# Patient Record
Sex: MALE | Race: WHITE | NOT HISPANIC OR LATINO | Employment: OTHER | URBAN - NONMETROPOLITAN AREA
[De-identification: names, ages, dates, MRNs, and addresses within clinical notes are randomized per-mention and may not be internally consistent; named-entity substitution may affect disease eponyms.]

---

## 2017-05-31 ENCOUNTER — TRANSCRIBE ORDERS (OUTPATIENT)
Dept: ADMINISTRATIVE | Facility: HOSPITAL | Age: 67
End: 2017-05-31

## 2017-05-31 ENCOUNTER — HOSPITAL ENCOUNTER (OUTPATIENT)
Dept: RADIOLOGY | Facility: HOSPITAL | Age: 67
Discharge: HOME | End: 2017-05-31
Attending: UROLOGY
Payer: COMMERCIAL

## 2017-05-31 DIAGNOSIS — N20.0 CALCULUS OF KIDNEY: ICD-10-CM

## 2017-05-31 PROCEDURE — 74000 HB X-RAY EXAM OF ABDOMEN (SINGLE ANTEROPOSTERIOR VIEW): CPT

## 2017-06-05 ENCOUNTER — ALLSCRIPTS OFFICE VISIT (OUTPATIENT)
Dept: OTHER | Facility: OTHER | Age: 67
End: 2017-06-05

## 2017-06-05 DIAGNOSIS — Z12.5 ENCOUNTER FOR SCREENING FOR MALIGNANT NEOPLASM OF PROSTATE: ICD-10-CM

## 2017-06-05 LAB
CLARITY UR: NORMAL
COLOR UR: YELLOW
GLUCOSE (HISTORICAL): NORMAL
HGB UR QL STRIP.AUTO: NORMAL
KETONES UR STRIP-MCNC: NORMAL MG/DL
LEUKOCYTE ESTERASE UR QL STRIP: NORMAL
NITRITE UR QL STRIP: NORMAL
PH UR STRIP.AUTO: 5 [PH]
PROT UR STRIP-MCNC: NORMAL MG/DL
SP GR UR STRIP.AUTO: 1.01

## 2017-06-06 ENCOUNTER — ALLSCRIPTS OFFICE VISIT (OUTPATIENT)
Dept: FAMILY MEDICINE CLINIC | Facility: CLINIC | Age: 67
End: 2017-06-06
Payer: COMMERCIAL

## 2017-06-06 LAB — HBA1C MFR BLD HPLC: 6.4 %

## 2017-06-06 PROCEDURE — 83036 HEMOGLOBIN GLYCOSYLATED A1C: CPT | Performed by: FAMILY MEDICINE

## 2017-06-06 PROCEDURE — 99213 OFFICE O/P EST LOW 20 MIN: CPT | Performed by: FAMILY MEDICINE

## 2017-06-07 ENCOUNTER — TRANSCRIBE ORDERS (OUTPATIENT)
Dept: ADMINISTRATIVE | Facility: HOSPITAL | Age: 67
End: 2017-06-07

## 2017-06-07 ENCOUNTER — APPOINTMENT (OUTPATIENT)
Dept: LAB | Facility: HOSPITAL | Age: 67
End: 2017-06-07
Attending: UROLOGY
Payer: COMMERCIAL

## 2017-06-07 DIAGNOSIS — Z12.5 ENCOUNTER FOR SCREENING FOR MALIGNANT NEOPLASM OF PROSTATE: ICD-10-CM

## 2017-06-07 LAB — PSA SERPL-MCNC: 3.1 NG/ML (ref 0–4)

## 2017-06-07 PROCEDURE — 36415 COLL VENOUS BLD VENIPUNCTURE: CPT

## 2017-06-07 PROCEDURE — G0103 PSA SCREENING: HCPCS

## 2017-12-06 ENCOUNTER — ALLSCRIPTS OFFICE VISIT (OUTPATIENT)
Dept: FAMILY MEDICINE CLINIC | Facility: CLINIC | Age: 67
End: 2017-12-06
Payer: COMMERCIAL

## 2017-12-06 LAB — HBA1C MFR BLD HPLC: 6.3 %

## 2017-12-06 PROCEDURE — 99213 OFFICE O/P EST LOW 20 MIN: CPT | Performed by: FAMILY MEDICINE

## 2017-12-23 ENCOUNTER — HOSPITAL ENCOUNTER (EMERGENCY)
Facility: HOSPITAL | Age: 67
Discharge: HOME | End: 2017-12-23
Payer: COMMERCIAL

## 2017-12-23 ENCOUNTER — APPOINTMENT (EMERGENCY)
Dept: RADIOLOGY | Facility: HOSPITAL | Age: 67
End: 2017-12-23
Payer: COMMERCIAL

## 2017-12-23 VITALS
OXYGEN SATURATION: 96 % | HEART RATE: 71 BPM | HEIGHT: 68 IN | SYSTOLIC BLOOD PRESSURE: 132 MMHG | TEMPERATURE: 98.2 F | WEIGHT: 215 LBS | DIASTOLIC BLOOD PRESSURE: 70 MMHG | BODY MASS INDEX: 32.58 KG/M2 | RESPIRATION RATE: 12 BRPM

## 2017-12-23 DIAGNOSIS — K21.9 GERD (GASTROESOPHAGEAL REFLUX DISEASE): ICD-10-CM

## 2017-12-23 DIAGNOSIS — R07.9 CHEST PAIN: Primary | ICD-10-CM

## 2017-12-23 LAB
ALBUMIN SERPL BCP-MCNC: 3.9 G/DL (ref 3.5–5)
ALP SERPL-CCNC: 66 U/L (ref 46–116)
ALT SERPL W P-5'-P-CCNC: 45 U/L (ref 12–78)
ANION GAP SERPL CALCULATED.3IONS-SCNC: 7 MMOL/L (ref 4–13)
AST SERPL W P-5'-P-CCNC: 18 U/L (ref 5–45)
BASOPHILS # BLD AUTO: 0.03 THOUSANDS/ΜL (ref 0–0.1)
BASOPHILS NFR BLD AUTO: 0 % (ref 0–1)
BILIRUB SERPL-MCNC: 0.3 MG/DL (ref 0.2–1)
BUN SERPL-MCNC: 30 MG/DL (ref 5–25)
CALCIUM SERPL-MCNC: 8.7 MG/DL (ref 8.3–10.1)
CHLORIDE SERPL-SCNC: 103 MMOL/L (ref 100–108)
CO2 SERPL-SCNC: 28 MMOL/L (ref 21–32)
CREAT SERPL-MCNC: 1.55 MG/DL (ref 0.6–1.3)
DEPRECATED D DIMER PPP: 345 NG/ML (FEU) (ref 0–424)
EOSINOPHIL # BLD AUTO: 0.31 THOUSAND/ΜL (ref 0–0.61)
EOSINOPHIL NFR BLD AUTO: 3 % (ref 0–6)
ERYTHROCYTE [DISTWIDTH] IN BLOOD BY AUTOMATED COUNT: 12.6 % (ref 11.6–15.1)
GFR SERPL CREATININE-BSD FRML MDRD: 45 ML/MIN/1.73SQ M
GLUCOSE SERPL-MCNC: 188 MG/DL (ref 65–140)
HCT VFR BLD AUTO: 35.5 % (ref 36.5–49.3)
HGB BLD-MCNC: 11.7 G/DL (ref 12–17)
LIPASE SERPL-CCNC: 175 U/L (ref 73–393)
LYMPHOCYTES # BLD AUTO: 1.36 THOUSANDS/ΜL (ref 0.6–4.47)
LYMPHOCYTES NFR BLD AUTO: 13 % (ref 14–44)
MCH RBC QN AUTO: 30.6 PG (ref 26.8–34.3)
MCHC RBC AUTO-ENTMCNC: 33 G/DL (ref 31.4–37.4)
MCV RBC AUTO: 93 FL (ref 82–98)
MONOCYTES # BLD AUTO: 1.18 THOUSAND/ΜL (ref 0.17–1.22)
MONOCYTES NFR BLD AUTO: 12 % (ref 4–12)
NEUTROPHILS # BLD AUTO: 7.39 THOUSANDS/ΜL (ref 1.85–7.62)
NEUTS SEG NFR BLD AUTO: 72 % (ref 43–75)
PLATELET # BLD AUTO: 263 THOUSANDS/UL (ref 149–390)
PMV BLD AUTO: 9 FL (ref 8.9–12.7)
POTASSIUM SERPL-SCNC: 4.2 MMOL/L (ref 3.5–5.3)
PROT SERPL-MCNC: 7.5 G/DL (ref 6.4–8.2)
RBC # BLD AUTO: 3.82 MILLION/UL (ref 3.88–5.62)
SODIUM SERPL-SCNC: 138 MMOL/L (ref 136–145)
TROPONIN I SERPL-MCNC: <0.02 NG/ML
WBC # BLD AUTO: 10.27 THOUSAND/UL (ref 4.31–10.16)

## 2017-12-23 PROCEDURE — 71020 HB CHEST X-RAY 2VW FRONTAL&LATL: CPT

## 2017-12-23 PROCEDURE — 83690 ASSAY OF LIPASE: CPT | Performed by: PHYSICIAN ASSISTANT

## 2017-12-23 PROCEDURE — 85379 FIBRIN DEGRADATION QUANT: CPT | Performed by: PHYSICIAN ASSISTANT

## 2017-12-23 PROCEDURE — 93005 ELECTROCARDIOGRAM TRACING: CPT

## 2017-12-23 PROCEDURE — 36415 COLL VENOUS BLD VENIPUNCTURE: CPT

## 2017-12-23 PROCEDURE — 99285 EMERGENCY DEPT VISIT HI MDM: CPT

## 2017-12-23 PROCEDURE — 84484 ASSAY OF TROPONIN QUANT: CPT

## 2017-12-23 PROCEDURE — 80053 COMPREHEN METABOLIC PANEL: CPT

## 2017-12-23 PROCEDURE — 85025 COMPLETE CBC W/AUTO DIFF WBC: CPT

## 2017-12-23 RX ORDER — SUCRALFATE ORAL 1 G/10ML
1000 SUSPENSION ORAL ONCE
Status: COMPLETED | OUTPATIENT
Start: 2017-12-23 | End: 2017-12-23

## 2017-12-23 RX ORDER — MAGNESIUM HYDROXIDE/ALUMINUM HYDROXICE/SIMETHICONE 120; 1200; 1200 MG/30ML; MG/30ML; MG/30ML
30 SUSPENSION ORAL ONCE
Status: COMPLETED | OUTPATIENT
Start: 2017-12-23 | End: 2017-12-23

## 2017-12-23 RX ADMIN — SUCRALFATE 1000 MG: 1 SUSPENSION ORAL at 16:35

## 2017-12-23 RX ADMIN — ALUMINUM HYDROXIDE, MAGNESIUM HYDROXIDE, AND SIMETHICONE 30 ML: 200; 200; 20 SUSPENSION ORAL at 16:36

## 2017-12-23 RX ADMIN — LIDOCAINE HYDROCHLORIDE 15 ML: 20 SOLUTION ORAL; TOPICAL at 16:36

## 2017-12-23 NOTE — ED PROCEDURE NOTE
Procedure  ECG 12 Lead Documentation  Date/Time: 12/23/2017 3:47 PM  Performed by: Brie Grover  Authorized by: Derik HILLS     Indications / Diagnosis:  Chest pain  ECG reviewed by me, the ED Provider: yes    Patient location:  Bedside  Interpretation:     Interpretation: normal    Rate:     ECG rate:  90    ECG rate assessment: normal    Rhythm:     Rhythm: sinus rhythm    Ectopy:     Ectopy: none    QRS:     QRS axis:  Normal    QRS intervals:  Normal  Conduction:     Conduction: normal    ST segments:     ST segments:  Normal  T waves:     T waves: normal

## 2017-12-23 NOTE — DISCHARGE INSTRUCTIONS
Chest Pain, Ambulatory Care   GENERAL INFORMATION:   Chest pain  can be caused by a range of conditions, from not serious to life-threatening  It may be caused by a heart attack or a blood clot in your lungs  Sometimes chest pain or pressure is caused by poor blood flow to your heart (angina)  Infection, inflammation, or a fracture in the bones or cartilage in your chest can cause pain or discomfort  Chest pain can also be a symptom of a digestive problem, such as acid reflux or a stomach ulcer  Common symptoms include the following:   · Fever or sweating     · Nausea or vomiting     · Shortness of breath     · Discomfort or pressure that spreads from your chest to your back, jaw, or arm     · A racing or slow heartbeat     · Feeling weak, tired, or faint  Seek immediate care for the following symptoms:   · Any of the following signs of a heart attack:      ¨ Squeezing, pressure, or pain in your chest that lasts longer than 5 minutes or returns    ¨ Discomfort or pain in your back, neck, jaw, stomach, or arm     ¨ Trouble breathing     ¨ Nausea or vomiting    ¨ Lightheadedness or a sudden cold sweat, especially with trouble breathing         · Chest discomfort that gets worse, even with medicine    · Coughing or vomiting blood    · Black or bloody bowel movements     · Vomiting that does not stop, or pain when you swallow  Treatment for chest pain  may include medicine to treat your symptoms while he determines the cause of your chest pain  You may also need any of the following:  · Antiplatelets , such as aspirin, help prevent blood clots  Take your antiplatelet medicine exactly as directed  These medicines make it more likely for you to bleed or bruise  If you are told to take aspirin, do not take acetaminophen or ibuprofen instead  · Prescription pain medicine  may be given  Ask how to take this medicine safely  Do not smoke: If you smoke, it is never too late to quit   Smoking increases your risk for a heart attack and other heart and lung conditions  Ask your healthcare provider for information about how to stop smoking if you need help  Follow up with your healthcare provider as directed: You may need more tests  You may be referred to a specialist, such as a cardiologist or gastroenterologist  Write down your questions so you remember to ask them during your visits  CARE AGREEMENT:   You have the right to help plan your care  Learn about your health condition and how it may be treated  Discuss treatment options with your caregivers to decide what care you want to receive  You always have the right to refuse treatment  The above information is an  only  It is not intended as medical advice for individual conditions or treatments  Talk to your doctor, nurse or pharmacist before following any medical regimen to see if it is safe and effective for you  © 2014 [address] is for End User's use only and may not be sold, redistributed or otherwise used for commercial purposes  All illustrations and images included in CareNotes® are the copyrighted property of [employer]  or [employer]  Gastroesophageal Reflux Disease   WHAT YOU NEED TO KNOW:   Gastroesophageal reflux occurs when acid and food in the stomach back up into the esophagus  Gastroesophageal reflux disease (GERD) is reflux that occurs more than twice a week for a few weeks  It usually causes heartburn and other symptoms  GERD can cause other health problems over time if it is not treated  DISCHARGE INSTRUCTIONS:   Seek care immediately if:   · You feel full and cannot burp or vomit  · You have severe chest pain and sudden trouble breathing  · Your bowel movements are black, bloody, or tarry-looking  · Your vomit looks like coffee grounds or has blood in it  Contact your healthcare provider if:   · You vomit large amounts, or you vomit often      · You have trouble breathing after you vomit  · You have trouble swallowing, or pain with swallowing  · You are losing weight without trying  · Your symptoms get worse or do not improve with treatment  · You have questions or concerns about your condition or care  Medicines:   · Medicines  are used to decrease stomach acid  Medicine may also be used to help your lower esophageal sphincter and stomach contract (tighten) more  · Take your medicine as directed  Contact your healthcare provider if you think your medicine is not helping or if you have side effects  Tell him or her if you are allergic to any medicine  Keep a list of the medicines, vitamins, and herbs you take  Include the amounts, and when and why you take them  Bring the list or the pill bottles to follow-up visits  Carry your medicine list with you in case of an emergency  Manage GERD:   · Do not have foods or drinks that may increase heartburn  These include chocolate, peppermint, fried or fatty foods, drinks that contain caffeine, or carbonated drinks (soda)  Other foods include spicy foods, onions, tomatoes, and tomato-based foods  Do not have foods or drinks that can irritate your esophagus, such as citrus fruits, juices, and alcohol  · Do not eat large meals  When you eat a lot of food at one time, your stomach needs more acid to digest it  Eat 6 small meals each day instead of 3 large ones, and eat slowly  Do not eat meals 2 to 3 hours before bedtime  · Elevate the head of your bed  Place 6-inch blocks under the head of your bed frame  You may also use more than one pillow under your head and shoulders while you sleep  · Maintain a healthy weight  If you are overweight, weight loss may help relieve symptoms of GERD  · Do not smoke  Smoking weakens the lower esophageal sphincter and increases the risk of GERD  Ask your healthcare provider for information if you currently smoke and need help to quit   E-cigarettes or smokeless tobacco still contain nicotine  Talk to your healthcare provider before you use these products  · Do not wear clothing that is tight around your waist   Tight clothing can put pressure on your stomach and cause or worsen GERD symptoms  Follow up with your healthcare provider as directed:  Write down your questions so you remember to ask them during your visits  © 2017 [address] Information is for End User's use only and may not be sold, redistributed or otherwise used for commercial purposes  All illustrations and images included in CareNotes® are the copyrighted property of [employer]  or [employer] Shepherd  The above information is an  only  It is not intended as medical advice for individual conditions or treatments  Talk to your doctor, nurse or pharmacist before following any medical regimen to see if it is safe and effective for you

## 2017-12-23 NOTE — ED PROVIDER NOTES
History  Chief Complaint   Patient presents with    Chest Pain     Intermittent sharp chest pain since last night, on left side radiating into back     Patient presents to the emergency department today offering a chief complaining of left-sided chest pain which commenced at around 2300 hours last evening  States the pain is sharp in the left upper chest and then dull in the left side of the back  Patient states the pain has been intermittent in nature  Patient denies any novel shortness of breath or dyspnea on exertion  He denies leg pain or swelling  Denies fevers  Patient states he has been worked up for cardiac related events in the past however this does not feel likely had before  Has been diagnosed with anxiety  Patient states he has had multiple echocardiograms 3 different at work and is very sure that he also had a clean cardiac catheterization  Prior to Admission Medications   Prescriptions Last Dose Informant Patient Reported? Taking? ALPRAZolam (XANAX) 0 5 mg tablet   Yes No   Sig: Take 0 5 mg by mouth 2 (two) times a day  ALPRAZolam (XANAX) 0 5 mg tablet   Yes Yes   Sig: Take 0 5 mg by mouth daily as needed for anxiety  Multiple Vitamin (MULTIVITAMIN) tablet   Yes Yes   Sig: Take 1 tablet by mouth daily  aspirin 81 MG tablet   Yes Yes   Sig: Take 81 mg by mouth daily  atorvastatin (LIPITOR) 80 mg tablet   Yes Yes   Sig: Take 80 mg by mouth daily  enalapril (VASOTEC) 20 mg tablet   Yes Yes   Sig: Take 20 mg by mouth daily  famotidine (PEPCID) 40 MG tablet   Yes Yes   Sig: Take 40 mg by mouth 2 (two) times a day  glimepiride (AMARYL) 2 mg tablet   Yes Yes   Sig: Take 4 mg by mouth every evening  metFORMIN (GLUCOPHAGE) 500 mg tablet   Yes Yes   Sig: Take 500 mg by mouth Indications: 1 tab in am and 2 tabs in evening  sitaGLIPtin (JANUVIA) 100 mg tablet   Yes Yes   Sig: Take 100 mg by mouth daily        Facility-Administered Medications: None       Past Medical History:   Diagnosis Date    Anxiety     Chronic kidney disease     kidney stones    Diabetes mellitus ([address] )     High cholesterol     Hypertension        Past Surgical History:   Procedure Laterality Date    APPENDECTOMY      CHOLECYSTECTOMY      UT CYSTO/URETERO W/LITHOTRIPSY &INDWELL STENT INSRT Right 2/3/2016    Procedure: CYSTOSCOPY; URETEROSCOPY; HOLMIUM LASER LITHOTRIPSY; BASKET STONE EXTRACTION; RETROGRADE PYELOGRAM; STENT PLACEMENT ;  Surgeon: Bre Kaye MD;  Location: [facility];  Service: Urology       History reviewed  No pertinent family history  I have reviewed and agree with the history as documented  Social History   Substance Use Topics    Smoking status: Never Smoker    Smokeless tobacco: Never Used    Alcohol use No        Review of Systems   Constitutional: Negative  HENT: Negative  Eyes: Negative  Respiratory: Negative  Cardiovascular: Positive for chest pain  Gastrointestinal: Negative  Endocrine: Negative  Genitourinary: Negative  Musculoskeletal: Negative  Skin: Negative  Allergic/Immunologic: Negative  Neurological: Negative  Hematological: Negative  Psychiatric/Behavioral: Negative  All other systems reviewed and are negative  Physical Exam  ED Triage Vitals [12/23/17 1429]   Temperature Pulse Respirations Blood Pressure SpO2   98 2 °F (36 8 °C) 94 18 145/69 97 %      Temp Source Heart Rate Source Patient Position - Orthostatic VS BP Location FiO2 (%)   Temporal Monitor Lying Right arm --      Pain Score       No Pain           Orthostatic Vital Signs  Vitals:    12/23/17 1500 12/23/17 1530 12/23/17 1600 12/23/17 1630   BP: 142/70 134/69 136/70 118/54   Pulse: 86 79 74 75   Patient Position - Orthostatic VS: Lying Lying  Lying       Physical Exam   Constitutional: He is oriented to person, place, and time  He appears well-developed and well-nourished  No distress  HENT:   Head: Normocephalic     Eyes: Pupils are equal, round, and reactive to light  Neck: Normal range of motion  Cardiovascular: Normal rate, regular rhythm, normal heart sounds and intact distal pulses  Exam reveals no gallop and no friction rub  No murmur heard  Pulmonary/Chest: Effort normal and breath sounds normal  No respiratory distress  He has no wheezes  He has no rales  He exhibits no tenderness  Abdominal: Soft  Musculoskeletal: Normal range of motion  Neurological: He is alert and oriented to person, place, and time  Skin: Skin is warm  Capillary refill takes less than 2 seconds  He is not diaphoretic  Psychiatric: He has a normal mood and affect  Vitals reviewed  ED Medications  Medications   aluminum-magnesium hydroxide-simethicone (MYLANTA) 200-200-20 mg/5 mL oral suspension 30 mL (30 mL Oral Given 12/23/17 1636)   lidocaine viscous (XYLOCAINE) 2 % mucosal solution 15 mL (15 mL Swish & Spit Given 12/23/17 1636)   sucralfate (CARAFATE) oral suspension 1,000 mg (1,000 mg Oral Given 12/23/17 1635)       Diagnostic Studies  Results Reviewed     Procedure Component Value Units Date/Time    Lipase [[id number]]  (Normal) Collected:  12/23/17 1433    Lab Status:  Final result Specimen:  Blood from Arm, Left Updated:  12/23/17 1540     Lipase 175 u/L     D-Dimer [[id number]]  (Normal) Collected:  12/23/17 1433    Lab Status:  Final result Specimen:  Blood from Arm, Left Updated:  12/23/17 1518     D-Dimer, Quant 345 ng/ml (FEU)     Troponin I [[id number]]  (Normal) Collected:  12/23/17 1433    Lab Status:  Final result Specimen:  Blood from Arm, Left Updated:  12/23/17 1457     Troponin I <0 02 ng/mL     Narrative:         [employer] Chemistry analyzer 99% cutoff is > 0 04 ng/mL in network labs    o cTnI 99% cutoff is useful only when applied to patients in the clinical setting of myocardial ischemia  o cTnI 99% cutoff should be interpreted in the context of clinical history, ECG findings and possibly cardiac imaging to establish correct diagnosis    o cTnI 99% cutoff may be suggestive but clearly not indicative of a coronary event without the clinical setting of myocardial ischemia  Comprehensive metabolic panel [[id number]]  (Abnormal) Collected:  12/23/17 1433    Lab Status:  Final result Specimen:  Blood from Arm, Left Updated:  12/23/17 1453     Sodium 138 mmol/L      Potassium 4 2 mmol/L      Chloride 103 mmol/L      CO2 28 mmol/L      Anion Gap 7 mmol/L      BUN 30 (H) mg/dL      Creatinine 1 55 (H) mg/dL      Glucose 188 (H) mg/dL      Calcium 8 7 mg/dL      AST 18 U/L      ALT 45 U/L      Alkaline Phosphatase 66 U/L      Total Protein 7 5 g/dL      Albumin 3 9 g/dL      Total Bilirubin 0 30 mg/dL      eGFR 45 ml/min/1 73sq m     Narrative:         National Kidney Disease Education Program recommendations are as follows:  GFR calculation is accurate only with a steady state creatinine  Chronic Kidney disease less than 60 ml/min/1 73 sq  meters  Kidney failure less than 15 ml/min/1 73 sq  meters      CBC and differential [[id number]]  (Abnormal) Collected:  12/23/17 1433    Lab Status:  Final result Specimen:  Blood from Arm, Left Updated:  12/23/17 1439     WBC 10 27 (H) Thousand/uL      RBC 3 82 (L) Million/uL      Hemoglobin 11 7 (L) g/dL      Hematocrit 35 5 (L) %      MCV 93 fL      MCH 30 6 pg      MCHC 33 0 g/dL      RDW 12 6 %      MPV 9 0 fL      Platelets 672 Thousands/uL      Neutrophils Relative 72 %      Lymphocytes Relative 13 (L) %      Monocytes Relative 12 %      Eosinophils Relative 3 %      Basophils Relative 0 %      Neutrophils Absolute 7 39 Thousands/µL      Lymphocytes Absolute 1 36 Thousands/µL      Monocytes Absolute 1 18 Thousand/µL      Eosinophils Absolute 0 31 Thousand/µL      Basophils Absolute 0 03 Thousands/µL                  X-ray chest 2 views    (Results Pending)              Procedures  Procedures       Phone Contacts  ED Phone Contact    ED Course  ED Course as of Dec 23 1723   Sat Dec 23, 2017   1502 Troponin I: <0 02 1502 WBC: (!) 10 27   1502 Hemoglobin: (!) 11 7   1502 Sodium: 138   1502 Potassium: 4 2   1502 Chloride: 103   1502 CO2: 28   1502 Anion Gap: 7   1502 BUN: (!) 30   1502 Creatinine: (!) 1 55   1502 Glucose: (!) 188   1502 eGFR: 45   1502 Albumin: 3 9   1502 Alkaline Phosphatase: 66   1534 D-DIMER QUANTITATIVE: 345         HEART Risk Score    Flowsheet Row Most Recent Value   History  1 Filed at: 12/23/2017 1548   ECG  0 Filed at: 12/23/2017 1548   Age  2 Filed at: 12/23/2017 1548   Risk Factors  1 Filed at: 12/23/2017 1548   Troponin  0 Filed at: 12/23/2017 1548   Heart Score Risk Calculator   History  1 Filed at: 12/23/2017 1548   ECG  0 Filed at: 12/23/2017 1548   Age  2 Filed at: 12/23/2017 1548   Risk Factors  1 Filed at: 12/23/2017 1548   Troponin  0 Filed at: 12/23/2017 1548   HEART Score  4 Filed at: 12/23/2017 1548   HEART Score  4 Filed at: 12/23/2017 1548                            MDM  CritCare Time    Disposition  Final diagnoses:   Chest pain   GERD (gastroesophageal reflux disease)     Time reflects when diagnosis was documented in both MDM as applicable and the Disposition within this note     Time User Action Codes Description Comment    12/23/2017  5:21 PM Natalie Barrera Add [R07 9] Chest pain     12/23/2017  5:21 PM Adrianna HILLS Add [K21 9] GERD (gastroesophageal reflux disease)       ED Disposition     ED Disposition Condition Comment    Discharge  [facility] discharge to home/self care      Condition at discharge: Good        Follow-up Information     Follow up With Specialties Details Why Contact Info Additional Information    [facility] Emergency Department Emergency Medicine  If symptoms worsen Corine Iglesias 1949  [phone] [place] ED, [address], [address] [place], [address] [facility] Schedule an appointment as soon as possible for a visit  Ronny Brandon  [id number]  [phone]           Patient's Medications   Discharge Prescriptions    No medications on file     No discharge procedures on file      ED Provider  Electronically Signed by           Clark Castro PA-C  12/23/17 8911

## 2017-12-26 LAB
ATRIAL RATE: 90 BPM
P AXIS: 49 DEGREES
PR INTERVAL: 152 MS
QRS AXIS: -49 DEGREES
QRSD INTERVAL: 98 MS
QT INTERVAL: 358 MS
QTC INTERVAL: 437 MS
T WAVE AXIS: 22 DEGREES
VENTRICULAR RATE: 90 BPM

## 2017-12-29 NOTE — PROGRESS NOTES
Assessment   1  Hypertension (401 9) (I10)   2  Hypercholesterolemia (272 0) (E78 00)   3  Type 2 diabetes mellitus with hyperglycemia (250 00) (E11 65)    Plan   Esophageal reflux    · Famotidine 40 MG Oral Tablet; take 1 tablet twice a day  Hypercholesterolemia    · Atorvastatin Calcium 80 MG Oral Tablet (Lipitor); TAKE 1 TABLET AT BEDTIME    FOR CHOLESTEROL  Hypertension    · Enalapril Maleate 20 MG Oral Tablet; Take 1 tablet daily   · Keep a diary of when and what you eat ; Status:Complete;   Done: 75EAY0661   · Take your blood pressure twice a week  Record the numbers and bring them with you to    your appointments ; Status:Complete;   Done: 57MIV5450  Influenza vaccine needed    · Influenza  Type 2 diabetes mellitus with hyperglycemia    · Some eating tips that can help you lose weight ; Status:Complete;   Done: 64DFB6379   · We recommend that you follow the Mediterranean diet ; Status:Complete;   Done:    15TGY6526    Discussion/Summary      Follow up in 6 months 2 diabetes-well controlled  No changes at this time  Reminder given for eye exam needed  continue working on diet changes including weight loss  next visit we will obtain a full panel of labs  The patient was counseled regarding instructions for management,-- importance of compliance with treatment  total time of encounter was 20 minutes-- and-- 10 minutes was spent counseling  Possible side effects of new medications were reviewed with the patient/guardian today  The treatment plan was reviewed with the patient/guardian  The patient/guardian understands and agrees with the treatment plan      Chief Complaint   pt here for 6 month f/u and would like flu shot      History of Present Illness   69year old here today for routine follow up  Patient has known type 2 diabetes he is currently taking a combination of oral agents, including glipizide, Januvia and metformin  He reports good control with his sugars  His A1c is 6 3   He offers no complaints today  He reports enjoying retired life, will be spending 6 months in in [place]  He enjoys spending time with his grandchildren  His appetite is good, he sleeps well at night  Review of Systems        Constitutional: No fever or chills, feels well, no tiredness, no recent weight gain or weight loss  Eyes: No complaints of eye pain, no red eyes, no discharge from eyes, no itchy eyes  ENT: no complaints of earache, no hearing loss, no nosebleeds, no nasal discharge, no sore throat, no hoarseness  Cardiovascular: No complaints of slow heart rate, no fast heart rate, no chest pain, no palpitations, no leg claudication, no lower extremity  Respiratory: No complaints of shortness of breath, no wheezing, no cough, no SOB on exertion, no orthopnea or PND  Gastrointestinal: No complaints of abdominal pain, no constipation, no nausea or vomiting, no diarrhea or bloody stools  Genitourinary: No complaints of dysuria, no incontinence, no hesitancy, no nocturia, no genital lesion, no testicular pain  Musculoskeletal: No complaints of arthralgia, no myalgias, no joint swelling or stiffness, no limb pain or swelling  Integumentary: No complaints of skin rash or skin lesions, no itching, no skin wound, no dry skin  Neurological: No compliants of headache, no confusion, no convulsions, no numbness or tingling, no dizziness or fainting, no limb weakness, no difficulty walking  Psychiatric: Is not suicidal, no sleep disturbances, no anxiety or depression, no change in personality, no emotional problems  Endocrine: No complaints of proptosis, no hot flashes, no muscle weakness, no erectile dysfunction, no deepening of the voice, no feelings of weakness  Hematologic/Lymphatic: No complaints of swollen glands, no swollen glands in the neck, does not bleed easily, no easy bruising  Active Problems   1  Acute Duodenitis (535 60)   2   Acute sinusitis (461 9) (J01 90)   3  Acute upper respiratory infection (465 9) (J06 9)   4  Diarrhea (787 91) (R19 7)   5  Elevated serum creatinine (790 99) (R79 89)   6  Esophageal reflux (530 81) (K21 9)   7  Flu vaccine need (V04 81) (Z23)   8  Hypercholesterolemia (272 0) (E78 00)   9  Hypertension (401 9) (I10)   10  Microalbuminuria (791 0) (R80 9)   11  Muscle cramp (729 82) (R25 2)   12  Need for pneumococcal vaccination (V03 82) (Z23)   13  Need for prophylactic vaccination and inoculation against influenza (V04 81) (Z23)   14  Need for shingles vaccine (V04 89) (Z23)   15  Need for tetanus booster (V03 7) (Z23)   16  Nephrolithiasis (592 0) (N20 0)   17  Post-nasal drip (784 91) (R09 82)   18  Preoperative clearance (V72 84) (Z01 818)   19  Prostate cancer screening (V76 44) (Z12 5)   20  Type 2 diabetes mellitus with hyperglycemia (250 00) (E11 65)    Past Medical History   1  History of malignant neoplasm of skin (V10 83) (Z85 828)   2  Need for prophylactic vaccination and inoculation against influenza (V04 81) (Z23)     The active problems and past medical history were reviewed and updated today  Surgical History   1  History of Appendectomy   2  History of Cholecystectomy   3  History of Tonsillectomy     The surgical history was reviewed and updated today  Family History   Mother    1  Family history of Type 2 Diabetes Mellitus  Father    2  Family history of Acute Myocardial Infarction (V17 3)   3  Family history of Coal Workers' Pneumoconiosis   4  Family history of Lung Cancer (V16 1)  Maternal Grandmother    5  Family history of Type 2 Diabetes Mellitus  Maternal Grandfather    6  Family history of Acute Myocardial Infarction (V17 3)   7  Family history of Stroke Syndrome (V17 1)  Paternal Grandfather    8  Family history of Reported Family History Of Cancer  Paternal Uncle    5  Family history of Leukemia (V16 6)     The family history was reviewed and updated today         Social History    · Denied: History of Alcohol   · Denied: History of Drug Use   · Former smoker (H07 28) (Q34 565)   ·   The social history was reviewed and updated today  The social history was reviewed and is unchanged  Current Meds    1  Aspir-81 81 MG Oral Tablet Delayed Release; Take 1 tablet daily Recorded   2  Atorvastatin Calcium 80 MG Oral Tablet; TAKE 1 TABLET AT BEDTIME FOR     CHOLESTEROL; Therapy: 56DEU8370 to (Evaluate:01Jun2018)  Requested for: 38LFN7187; Last     Rx:06Jun2017 Ordered   3  BD Pen Needle Short U/F 31G X 8 MM Miscellaneous; USE AS DIRECTED; Therapy: 23ZGB5745 to (Evaluate:08Oct2014); Last Rx:01Hja2736 Ordered   4  Centrum Silver Oral Tablet Recorded   5  Enalapril Maleate 20 MG Oral Tablet; Take 1 tablet daily; Therapy: 48GOX5992 to (Evaluate:01Jun2018)  Requested for: 61BIU4537; Last     Rx:06Jun2017 Ordered   6  Famotidine 40 MG Oral Tablet; take 1 tablet twice a day; Therapy: 90GXL1466 to (Last [facility])  Requested for: 46SDK7292 Ordered   7  Glimepiride 1 MG Oral Tablet; one and a half tablets daily; Therapy: 59Ieh5224 to Recorded   8  Januvia 100 MG Oral Tablet; TAKE 1 TABLET DAILY; Therapy: 39Bbe1676 to (Evaluate:85Ear8106)  Requested for: 98Tlk4254; Last     Rx:03Iyx9753 Ordered   9  MetFORMIN HCl  MG Oral Tablet Extended Release 24 Hour; One tablet twice     daily with meals for diabetes; Therapy: 06BTY2885 to (Evaluate:38Zbz5902)  Requested for: 11KUO4861; Last     Rx:65Nsf9628 Ordered   10  Pepcid 40 MG Oral Tablet; Take 1 tablet twice daily Recorded     The medication list was reviewed and updated today  Allergies   1  No Known Drug Allergies    Vitals   Vital Signs    Recorded: 88PJT4767 10:25AM   Temperature 95 9 F   Heart Rate 78   Respiration 18   Systolic 065   Diastolic 66   Weight 525 lb    BMI Calculated 33 45   BSA Calculated 2 13   O2 Saturation 95     Physical Exam        Constitutional      General appearance: Abnormal   overweight  Pulmonary      Respiratory effort: No increased work of breathing or signs of respiratory distress  Auscultation of lungs: Clear to auscultation, equal breath sounds bilaterally, no wheezes, no rales, no rhonci  Cardiovascular      Auscultation of heart: Normal rate and rhythm, normal S1 and S2, without murmurs  Examination of extremities for edema and/or varicosities: Normal        Abdomen      Abdomen: Abnormal   The abdomen was rounded  Liver and spleen: No hepatomegaly or splenomegaly  Lymphatic      Palpation of lymph nodes in neck: No lymphadenopathy  Skin      Skin and subcutaneous tissue: Normal without rashes or lesions  Psychiatric      Orientation to person, place and time: Normal        Mood and affect: Normal           Results/Data   Hemoglobin A1c- POC 74PFE1379 10:28AM Judy Jackson      Test Name Result Flag Reference   HEMOGLOBIN A1C 6 3          Health Management   Type 2 diabetes mellitus with hyperglycemia   *VB - Eye Exam; every 1 year; Next Due: 22VDD0969; Overdue  *VB - Foot Exam; every 1 year; Last 09CHW6979; Next Due: 95ZFS0123; Active    Future Appointments      Date/Time Provider Specialty Site   06/06/2018 01:[place] [id number]     Signatures    Electronically signed by :  ROSEMARY Valenzuela; Dec  6 2017 10:49AM EST                       (Author)     Electronically signed by : Emma Harmon DO; Dec 28 2017  3:35PM EST                       (Author)

## 2018-01-13 VITALS
HEART RATE: 76 BPM | DIASTOLIC BLOOD PRESSURE: 80 MMHG | SYSTOLIC BLOOD PRESSURE: 136 MMHG | WEIGHT: 222 LBS | BODY MASS INDEX: 33.65 KG/M2 | HEIGHT: 68 IN

## 2018-01-14 VITALS
HEART RATE: 88 BPM | OXYGEN SATURATION: 95 % | BODY MASS INDEX: 33.19 KG/M2 | SYSTOLIC BLOOD PRESSURE: 122 MMHG | HEIGHT: 68 IN | TEMPERATURE: 97.1 F | RESPIRATION RATE: 20 BRPM | WEIGHT: 219 LBS | DIASTOLIC BLOOD PRESSURE: 78 MMHG

## 2018-01-18 NOTE — MISCELLANEOUS
Message  PTS PRE AUTH FOR HIS NUCLEAR STRESS TEST THAT THE [facility]PT HAD MADE/SCHEDULE FOR HIM IS [id number] JASWINDER MAX 03/22/2016  NMG      Active Problems    1  Acute Duodenitis (535 60)   2  Acute upper respiratory infection (465 9) (J06 9)   3  Diarrhea (787 91) (R19 7)   4  Elevated serum creatinine (790 99) (R74 8)   5  Esophageal reflux (530 81) (K21 9)   6  Flu vaccine need (V04 81) (Z23)   7  Hypercholesterolemia (272 0) (E78 0)   8  Hypertension (401 9) (I10)   9  Microalbuminuria (791 0) (R80 9)   10  Muscle cramp (729 82) (R25 2)   11  Need for prophylactic vaccination and inoculation against influenza (V04 81) (Z23)   12  Need for shingles vaccine (V04 89) (Z23)   13  Nephrolithiasis (592 0) (N20 0)   14  Post-nasal drip (784 91) (R09 82)   15  Preoperative clearance (V72 84) (Z01 818)   16  Type 2 diabetes mellitus with hyperglycemia (250 00) (E11 65)    Current Meds   1  ALPRAZolam ER 0 5 MG Oral Tablet Extended Release 24 Hour; Therapy: 97JSE6033 to (Last Rohith Kwan)  Requested for: 53MSI4937 Ordered   2  Aspir-81 81 MG Oral Tablet Delayed Release; Take 1 tablet daily Recorded   3  Atorvastatin Calcium 80 MG Oral Tablet (Lipitor); TAKE 1 TABLET AT BEDTIME FOR   CHOLESTEROL; Therapy: 77AWR1853 to (Evaluate:23Jun2016)  Requested for: 63AVE6609; Last   Rx:29Jun2015 Ordered   4  BD Pen Needle Short U/F 31G X 8 MM Miscellaneous; USE AS DIRECTED; Therapy: 77BKY8422 to (Evaluate:08Oct2014); Last Rx:40Jbi2904 Ordered   5  Centrum Silver Oral Tablet Recorded   6  Enalapril Maleate 20 MG Oral Tablet; Take 1 tablet daily; Therapy: 21DOK4450 to (Evaluate:23Jun2016)  Requested for: 44OGB7392; Last   Rx:29Jun2015 Ordered   7  Glimepiride 1 MG Oral Tablet; one and a half tablets daily; Therapy: 02Udj9570 to Recorded   8  Januvia 100 MG Oral Tablet; TAKE 1 TABLET DAILY; Therapy: 09Yfq3373 to (Evaluate:29Qoo1037)  Requested for: 31Yja2865; Last   Rx:76Mce9764 Ordered   9   MetFORMIN HCl  MG Oral Tablet Extended Release 24 Hour; One tablet twice   daily with meals for diabetes; Therapy: 72OOM2944 to (Evaluate:00Bjd1269)  Requested for: 36QHB7787; Last   Rx:71Tkt2624 Ordered   10  Pepcid 40 MG Oral Tablet (Famotidine); Take 1 tablet twice daily Recorded   11  Zoster (Zostavax) (Zoster (Zostavax)); INJECT 0 65  ML Subcutaneous; Therapy: 24MBQ1016 to (Last Rx:42Mvw9661) Ordered    Allergies    1   No Known Drug Allergies    Signatures   Electronically signed by : Angel Preston, ; Feb 22 2016  3:19PM EST                       (Author)

## 2018-01-22 VITALS
OXYGEN SATURATION: 95 % | TEMPERATURE: 95.9 F | RESPIRATION RATE: 18 BRPM | WEIGHT: 220 LBS | SYSTOLIC BLOOD PRESSURE: 122 MMHG | HEART RATE: 78 BPM | DIASTOLIC BLOOD PRESSURE: 66 MMHG | BODY MASS INDEX: 33.45 KG/M2

## 2018-01-25 ENCOUNTER — OFFICE VISIT (OUTPATIENT)
Dept: FAMILY MEDICINE CLINIC | Facility: CLINIC | Age: 68
End: 2018-01-25
Payer: COMMERCIAL

## 2018-01-25 VITALS
BODY MASS INDEX: 33.65 KG/M2 | RESPIRATION RATE: 16 BRPM | DIASTOLIC BLOOD PRESSURE: 70 MMHG | SYSTOLIC BLOOD PRESSURE: 118 MMHG | TEMPERATURE: 97.8 F | OXYGEN SATURATION: 97 % | WEIGHT: 222 LBS | HEIGHT: 68 IN | HEART RATE: 102 BPM

## 2018-01-25 DIAGNOSIS — J06.9 UPPER RESPIRATORY INFECTION, ACUTE: Primary | ICD-10-CM

## 2018-01-25 PROCEDURE — 99213 OFFICE O/P EST LOW 20 MIN: CPT | Performed by: FAMILY MEDICINE

## 2018-01-25 RX ORDER — DEXTROMETHORPHAN HYDROBROMIDE AND PROMETHAZINE HYDROCHLORIDE 15; 6.25 MG/5ML; MG/5ML
5 SYRUP ORAL 4 TIMES DAILY PRN
Qty: 118 ML | Refills: 0 | Status: SHIPPED | OUTPATIENT
Start: 2018-01-25 | End: 2018-02-04

## 2018-01-25 RX ORDER — AZITHROMYCIN 250 MG/1
250 TABLET, FILM COATED ORAL DAILY
Qty: 6 TABLET | Refills: 0 | Status: SHIPPED | OUTPATIENT
Start: 2018-01-25 | End: 2018-01-30

## 2018-01-25 NOTE — PROGRESS NOTES
Assessment/Plan:         Diagnoses and all orders for this visit:    Upper respiratory infection, acute  -     azithromycin (ZITHROMAX) 250 mg tablet; Take 1 tablet by mouth daily for 5 days 2 pills today, then one daily for 4 more days  -     promethazine-dextromethorphan (PHENERGAN-DM) 6 25-15 mg/5 mL oral syrup; Take 5 mL by mouth 4 (four) times a day as needed for cough for up to 10 days          Subjective:      Patient ID: Jaja Quinteros is a 69 y o  male  69year old male reports today for acute sick visit  Pt has been feeling ill for 2 days  He complains of cough, productive at hs  He reports coughing to the point his back hurts in the am  He reports chills at hs  He started with a runny nose today  He denies being aroudn any ill contacts  He took tylenol with no relief  He is concerned about his flight to [place] in one week  Cough   This is a new problem  The current episode started in the past 7 days  The problem has been unchanged  The problem occurs every few hours  The cough is productive of sputum  Associated symptoms include chills, nasal congestion and rhinorrhea  Pertinent negatives include no chest pain, ear pain, fever, headaches or postnasal drip  Nothing aggravates the symptoms  Treatments tried: tylenol  Back Pain   This is a new problem  The current episode started in the past 7 days  Episode frequency: hs  Pain location: bilateral  flank  The quality of the pain is described as aching  The pain does not radiate  The pain is at a severity of 5/10  The pain is mild  The pain is worse during the night  The symptoms are aggravated by coughing  Pertinent negatives include no chest pain, fever or headaches  Review of Systems   Constitutional: Positive for chills  Negative for fever  HENT: Positive for rhinorrhea  Negative for ear pain and postnasal drip  Respiratory: Positive for cough  Cardiovascular: Negative for chest pain     Musculoskeletal: Positive for back pain    Neurological: Negative for headaches  Objective:     Physical Exam   Constitutional: He appears well-developed  HENT:   Mouth/Throat: No oropharyngeal exudate  Eyes: Pupils are equal, round, and reactive to light  Neck: Normal range of motion  Cardiovascular: Normal rate  Pulmonary/Chest: No respiratory distress  Abdominal: Soft  Musculoskeletal: Normal range of motion  Neurological: He is alert  Skin: Skin is warm

## 2018-01-25 NOTE — PATIENT INSTRUCTIONS
Upper Respiratory Infection   AMBULATORY CARE:   An upper respiratory infection  is also called a common cold  It can affect your nose, throat, ears, and sinuses  Common signs and symptoms include the following:  Cold symptoms are usually worst for the first 3 to 5 days  You may have any of the following:  · Runny or stuffy nose    · Sneezing and coughing    · Sore throat or hoarseness    · Red, watery, and sore eyes    · Fatigue     · Chills and fever    · Headache, body aches, or sore muscles  Seek care immediately if:   · You have chest pain or trouble breathing  Contact your healthcare provider if:   · You have a fever over 102ºF (39°C)  · Your sore throat gets worse or you see white or yellow spots in your throat  · Your symptoms get worse after 3 to 5 days or your cold is not better in 14 days  · You have a rash anywhere on your skin  · You have large, tender lumps in your neck  · You have thick, green or yellow drainage from your nose  · You cough up thick yellow, green, or bloody mucus  · You have vomiting for more than 24 hours and cannot keep fluids down  · You have a bad earache  · You have questions or concerns about your condition or care  Treatment for a cold: There is no cure for the common cold  Colds are caused by viruses and do not get better with antibiotics  Most people get better in 7 to 14 days  You may continue to cough for 2 to 3 weeks  The following may help decrease your symptoms:  · Decongestants  help reduce nasal congestion and help you breathe more easily  If you take decongestant pills, they may make you feel restless or not able to sleep  Do not use decongestant sprays for more than a few days  · Cough suppressants  help reduce coughing  Ask your healthcare provider which type of cough medicine is best for you  · NSAIDs , such as ibuprofen, help decrease swelling, pain, and fever   NSAIDs can cause stomach bleeding or kidney problems in certain people  If you take blood thinner medicine, always ask your healthcare provider if NSAIDs are safe for you  Always read the medicine label and follow directions  · Acetaminophen  decreases pain and fever  It is available without a doctor's order  Ask how much to take and how often to take it  Follow directions  Read the labels of all other medicines you are using to see if they also contain acetaminophen, or ask your doctor or pharmacist  Acetaminophen can cause liver damage if not taken correctly  Do not use more than 4 grams (4,000 milligrams) total of acetaminophen in one day  Manage your cold:   · Rest as much as possible  Slowly start to do more each day  · Drink more liquids as directed  Liquids will help thin and loosen mucus so you can cough it up  Liquids will also help prevent dehydration  Liquids that help prevent dehydration include water, fruit juice, and broth  Do not drink liquids that contain caffeine  Caffeine can increase your risk for dehydration  Ask your healthcare provider how much liquid to drink each day  · Soothe a sore throat  Gargle with warm salt water  This helps your sore throat feel better  Make salt water by dissolving ¼ teaspoon salt in 1 cup warm water  You may also suck on hard candy or throat lozenges  You may use a sore throat spray  · Use a humidifier or vaporizer  Use a cool mist humidifier or a vaporizer to increase air moisture in your home  This may make it easier for you to breathe and help decrease your cough  · Use saline nasal drops as directed  These help relieve congestion  · Apply petroleum-based jelly around the outside of your nostrils  This can decrease irritation from blowing your nose  · Do not smoke  Nicotine and other chemicals in cigarettes and cigars can make your symptoms worse  They can also cause infections such as bronchitis or pneumonia   Ask your healthcare provider for information if you currently smoke and need help to quit  E-cigarettes or smokeless tobacco still contain nicotine  Talk to your healthcare provider before you use these products  Prevent spreading your cold to others:   · Try to stay away from other people during the first 2 to 3 days of your cold when it is more easily spread  · Do not share food or drinks  · Do not share hand towels with household members  · Wash your hands often, especially after you blow your nose  Turn away from other people and cover your mouth and nose with a tissue when you sneeze or cough  Follow up with your healthcare provider as directed:  Write down your questions so you remember to ask them during your visits  © 2017 [facility] Information is for End User's use only and may not be sold, redistributed or otherwise used for commercial purposes  All illustrations and images included in CareNotes® are the copyrighted property of [facility] [employer] [facility] [employer]  or [employer]  The above information is an  only  It is not intended as medical advice for individual conditions or treatments  Talk to your doctor, nurse or pharmacist before following any medical regimen to see if it is safe and effective for you

## 2018-02-08 DIAGNOSIS — K21.9 GASTROESOPHAGEAL REFLUX DISEASE WITHOUT ESOPHAGITIS: Primary | ICD-10-CM

## 2018-02-08 RX ORDER — FAMOTIDINE 40 MG/1
40 TABLET, FILM COATED ORAL 2 TIMES DAILY
Qty: 180 TABLET | Refills: 2 | Status: SHIPPED | OUTPATIENT
Start: 2018-02-08 | End: 2018-02-13 | Stop reason: SDUPTHER

## 2018-02-13 DIAGNOSIS — K21.9 GASTROESOPHAGEAL REFLUX DISEASE WITHOUT ESOPHAGITIS: ICD-10-CM

## 2018-02-13 RX ORDER — FAMOTIDINE 40 MG/1
40 TABLET, FILM COATED ORAL 2 TIMES DAILY
Qty: 180 TABLET | Refills: 1 | Status: SHIPPED | OUTPATIENT
Start: 2018-02-13 | End: 2018-06-06 | Stop reason: SDUPTHER

## 2018-05-07 ENCOUNTER — OFFICE VISIT (OUTPATIENT)
Dept: FAMILY MEDICINE CLINIC | Facility: CLINIC | Age: 68
End: 2018-05-07
Payer: COMMERCIAL

## 2018-05-07 VITALS
OXYGEN SATURATION: 95 % | RESPIRATION RATE: 16 BRPM | BODY MASS INDEX: 33.8 KG/M2 | TEMPERATURE: 97.7 F | DIASTOLIC BLOOD PRESSURE: 82 MMHG | SYSTOLIC BLOOD PRESSURE: 140 MMHG | HEART RATE: 11 BPM | WEIGHT: 223 LBS | HEIGHT: 68 IN

## 2018-05-07 DIAGNOSIS — H65.03 BILATERAL ACUTE SEROUS OTITIS MEDIA, RECURRENCE NOT SPECIFIED: Primary | ICD-10-CM

## 2018-05-07 DIAGNOSIS — Z13.31 NEGATIVE DEPRESSION SCREENING: ICD-10-CM

## 2018-05-07 PROCEDURE — 99213 OFFICE O/P EST LOW 20 MIN: CPT | Performed by: FAMILY MEDICINE

## 2018-05-07 RX ORDER — AMOXICILLIN 500 MG/1
500 CAPSULE ORAL EVERY 8 HOURS SCHEDULED
Qty: 30 CAPSULE | Refills: 0 | Status: SHIPPED | OUTPATIENT
Start: 2018-05-07 | End: 2018-05-17

## 2018-05-07 NOTE — PROGRESS NOTES
OFFICE VISIT  Deep Borrego 69 y o  male MRN: [id number]      Assessment / Plan:  Diagnoses and all orders for this visit:    Bilateral acute serous otitis media, recurrence not specified  -     amoxicillin (AMOXIL) 500 mg capsule; Take 1 capsule (500 mg total) by mouth every 8 (eight) hours for 10 days    Negative depression screening          Reason For Visit / Chief Complaint  Chief Complaint   Patient presents with    Earache      Bilateral Ear Pain x4 days        HPI:  Deep Borrego is a 69 y o  male presents today for b/l ear pain, started four days  Patient has spent the last 2 months in [place]  Patient reports he noticed slight ear drainage 4 days ago  He has no fever chills  No other symptoms  Historical Information   Past Medical History:   Diagnosis Date    Anxiety     Chronic kidney disease     kidney stones    Diabetes mellitus ([address] )     High cholesterol     Hypertension      Past Surgical History:   Procedure Laterality Date    APPENDECTOMY      CHOLECYSTECTOMY      SD CYSTO/URETERO W/LITHOTRIPSY &INDWELL STENT INSRT Right 2/3/2016    Procedure: CYSTOSCOPY; URETEROSCOPY; HOLMIUM LASER LITHOTRIPSY; BASKET STONE EXTRACTION; RETROGRADE PYELOGRAM; STENT PLACEMENT ;  Surgeon: Jose Najera MD;  Location: [facility];  Service: Urology     Social History   History   Alcohol Use No     History   Drug Use No     History   Smoking Status    Never Smoker   Smokeless Tobacco    Never Used     No family history on file  Meds/Allergies   No Known Allergies    Meds:    Current Outpatient Prescriptions:     ALPRAZolam (XANAX) 0 5 mg tablet, Take 0 5 mg by mouth 2 (two) times a day , Disp: , Rfl:     aspirin 81 MG tablet, Take 81 mg by mouth daily  , Disp: , Rfl:     atorvastatin (LIPITOR) 80 mg tablet, Take 80 mg by mouth daily  , Disp: , Rfl:     enalapril (VASOTEC) 20 mg tablet, Take 20 mg by mouth daily  , Disp: , Rfl:     famotidine (PEPCID) 40 MG tablet, Take 1 tablet (40 mg total) by mouth 2 (two) times a day, Disp: 180 tablet, Rfl: 1    glimepiride (AMARYL) 2 mg tablet, Take 4 mg by mouth every evening , Disp: , Rfl:     metFORMIN (GLUCOPHAGE) 500 mg tablet, Take 500 mg by mouth Indications: 1 tab in am and 2 tabs in evening , Disp: , Rfl:     Multiple Vitamin (MULTIVITAMIN) tablet, Take 1 tablet by mouth daily  , Disp: , Rfl:     sitaGLIPtin (JANUVIA) 100 mg tablet, Take 100 mg by mouth daily  , Disp: , Rfl:     amoxicillin (AMOXIL) 500 mg capsule, Take 1 capsule (500 mg total) by mouth every 8 (eight) hours for 10 days, Disp: 30 capsule, Rfl: 0      REVIEW OF SYSTEMS  A comprehensive review of systems was negative except for: Ears, nose, mouth, throat, and face: positive for ear drainage and earaches      Current Vitals:   Blood Pressure: 140/82 (05/07/18 1121)  Pulse: (!) 11 (05/07/18 1121)  Temperature: 97 7 °F (36 5 °C) (05/07/18 1121)  Temp Source: Tympanic (05/07/18 1121)  Respirations: 16 (05/07/18 1121)  Height: 5' 8" (172 7 cm) (05/07/18 1121)  Weight - Scale: 101 kg (223 lb) (05/07/18 1121)  SpO2: 95 % (05/07/18 1121)  [unfilled]    PHYSICAL EXAMS:  General appearance: alert and oriented, in no acute distress  Head: Normocephalic, without obvious abnormality, atraumatic  Eyes: conjunctivae/corneas clear  PERRL, EOM's intact  Fundi benign  Ears: abnormal TM right ear - bulging and abnormal TM left ear - bulging  Nose: no discharge  Back: did not exam  Heart: regular rate and rhythm, S1, S2 normal, no murmur, click, rub or ZXRBSM{YSX/BT:22        Follow up at this office in if not better     Counseling / Coordination of Care  Total floor / unit time spent today 20 minutes  Greater than 50% of total time was spent with the patient and / or family counseling and / or coordination of care

## 2018-06-06 ENCOUNTER — OFFICE VISIT (OUTPATIENT)
Dept: FAMILY MEDICINE CLINIC | Facility: CLINIC | Age: 68
End: 2018-06-06
Payer: COMMERCIAL

## 2018-06-06 VITALS
TEMPERATURE: 97.7 F | OXYGEN SATURATION: 98 % | SYSTOLIC BLOOD PRESSURE: 140 MMHG | DIASTOLIC BLOOD PRESSURE: 84 MMHG | BODY MASS INDEX: 34.21 KG/M2 | WEIGHT: 225 LBS | HEART RATE: 73 BPM

## 2018-06-06 DIAGNOSIS — K21.9 GASTROESOPHAGEAL REFLUX DISEASE WITHOUT ESOPHAGITIS: ICD-10-CM

## 2018-06-06 DIAGNOSIS — I10 BENIGN ESSENTIAL HYPERTENSION: ICD-10-CM

## 2018-06-06 DIAGNOSIS — E11.65 TYPE 2 DIABETES MELLITUS WITH HYPERGLYCEMIA, WITHOUT LONG-TERM CURRENT USE OF INSULIN (HCC): Primary | ICD-10-CM

## 2018-06-06 DIAGNOSIS — E78.5 HYPERLIPIDEMIA, UNSPECIFIED HYPERLIPIDEMIA TYPE: ICD-10-CM

## 2018-06-06 PROBLEM — E11.9 DIABETES MELLITUS (HCC): Status: ACTIVE | Noted: 2018-06-06

## 2018-06-06 PROCEDURE — 99213 OFFICE O/P EST LOW 20 MIN: CPT | Performed by: FAMILY MEDICINE

## 2018-06-06 RX ORDER — ENALAPRIL MALEATE 20 MG/1
20 TABLET ORAL DAILY
Qty: 90 TABLET | Refills: 2 | Status: SHIPPED | OUTPATIENT
Start: 2018-06-06

## 2018-06-06 RX ORDER — FAMOTIDINE 40 MG/1
40 TABLET, FILM COATED ORAL 2 TIMES DAILY
Qty: 180 TABLET | Refills: 2 | Status: SHIPPED | OUTPATIENT
Start: 2018-06-06 | End: 2019-08-12

## 2018-06-06 RX ORDER — ATORVASTATIN CALCIUM 80 MG/1
80 TABLET, FILM COATED ORAL DAILY
Qty: 90 TABLET | Refills: 2 | Status: SHIPPED | OUTPATIENT
Start: 2018-06-06 | End: 2019-03-14 | Stop reason: SDUPTHER

## 2018-06-06 NOTE — PROGRESS NOTES
Assessment/Plan:     Diagnoses and all orders for this visit:    Type 2 diabetes mellitus with hyperglycemia, without long-term current use of insulin (HCC)    Benign essential hypertension  -     enalapril (VASOTEC) 20 mg tablet; Take 1 tablet (20 mg total) by mouth daily    Hyperlipidemia, unspecified hyperlipidemia type  -     atorvastatin (LIPITOR) 80 mg tablet; Take 1 tablet (80 mg total) by mouth daily    Gastroesophageal reflux disease without esophagitis  -     famotidine (PEPCID) 40 MG tablet; Take 1 tablet (40 mg total) by mouth 2 (two) times a day      Discussion/Plan:  Continue current medications  Labs drawn - medication adjustment/further management after results available  Healthy diet and exercise advised  F/U in 6 months or sooner if needed  Subjective:      Patient ID: Ade Peraza is a 69 y o  male  Chief Complaint   Patient presents with    Follow-up     6 month follow up    Medication Refill       Patient presents to the office today for 6 month f/u and medication refill  Patient had labs drawn this morning  Patient is a type 2 diabetic, last a1c 6 3  Patient has an eye doctor, JAYLAN on visits  Patient is sleeping and eating okay  He does reports leg cramps at night sometimes           The following portions of the patient's history were reviewed and updated as appropriate: allergies, current medications, past family history, past medical history, past social history, past surgical history and problem list     Patient Active Problem List   Diagnosis    Upper respiratory infection, acute    Duodenitis    Benign neoplasm of skin    Diabetes mellitus ([facility] [address] )    Esophageal reflux    Benign essential hypertension    GERD (gastroesophageal reflux disease)    Hyperlipidemia    Hypercholesterolemia    Hypertension    Microalbuminuria    Nephrolithiasis    Panic disorder    Type 2 diabetes mellitus with hyperglycemia ([facility] [address] )    Type II or unspecified type diabetes mellitus without mention of complication, not stated as uncontrolled     Current Outpatient Prescriptions on File Prior to Visit   Medication Sig Dispense Refill    ALPRAZolam (XANAX) 0 5 mg tablet Take 0 5 mg by mouth 2 (two) times a day   aspirin 81 MG tablet Take 81 mg by mouth daily   glimepiride (AMARYL) 2 mg tablet Take 4 mg by mouth every evening   metFORMIN (GLUCOPHAGE) 500 mg tablet Take 500 mg by mouth Indications: 1 tab in am and 2 tabs in evening   Multiple Vitamin (MULTIVITAMIN) tablet Take 1 tablet by mouth daily   sitaGLIPtin (JANUVIA) 100 mg tablet Take 100 mg by mouth daily   [DISCONTINUED] atorvastatin (LIPITOR) 80 mg tablet Take 80 mg by mouth daily   [DISCONTINUED] enalapril (VASOTEC) 20 mg tablet Take 20 mg by mouth daily   [DISCONTINUED] famotidine (PEPCID) 40 MG tablet Take 1 tablet (40 mg total) by mouth 2 (two) times a day 180 tablet 1     No current facility-administered medications on file prior to visit  Review of Systems   Constitutional: Negative  HENT: Negative  Respiratory: Negative  Cardiovascular: Negative  Gastrointestinal: Negative  Genitourinary: Negative  Neurological: Negative  Psychiatric/Behavioral: Negative  Objective:      /84   Pulse 73   Temp 97 7 °F (36 5 °C)   Wt 102 kg (225 lb)   SpO2 98%   BMI 34 21 kg/m²        Physical Exam   Constitutional: He is oriented to person, place, and time  He appears well-developed and well-nourished  obese   HENT:   Head: Normocephalic and atraumatic  Right Ear: External ear normal    Left Ear: External ear normal    Nose: Nose normal    Mouth/Throat: Oropharynx is clear and moist    Eyes: Conjunctivae are normal  Pupils are equal, round, and reactive to light  Neck: Neck supple  Cardiovascular: Normal rate and regular rhythm  Pulmonary/Chest: Effort normal and breath sounds normal    Abdominal: Soft  Bowel sounds are normal  There is no tenderness  Neurological: He is alert and oriented to person, place, and time  Skin: Skin is warm and dry  Psychiatric: He has a normal mood and affect   His behavior is normal

## 2019-02-11 ENCOUNTER — OFFICE VISIT (OUTPATIENT)
Dept: FAMILY MEDICINE CLINIC | Facility: HOME HEALTHCARE | Age: 69
End: 2019-02-11
Payer: COMMERCIAL

## 2019-02-11 VITALS
TEMPERATURE: 97.4 F | OXYGEN SATURATION: 99 % | HEIGHT: 68 IN | SYSTOLIC BLOOD PRESSURE: 138 MMHG | DIASTOLIC BLOOD PRESSURE: 80 MMHG | RESPIRATION RATE: 16 BRPM | WEIGHT: 224 LBS | BODY MASS INDEX: 33.95 KG/M2 | HEART RATE: 88 BPM

## 2019-02-11 DIAGNOSIS — J01.10 ACUTE NON-RECURRENT FRONTAL SINUSITIS: Primary | ICD-10-CM

## 2019-02-11 PROCEDURE — 99213 OFFICE O/P EST LOW 20 MIN: CPT | Performed by: FAMILY MEDICINE

## 2019-02-11 RX ORDER — LANCETS
EACH MISCELLANEOUS
COMMUNITY
Start: 2018-06-14

## 2019-02-11 RX ORDER — OMEPRAZOLE 20 MG/1
20 CAPSULE, DELAYED RELEASE ORAL 2 TIMES DAILY
Refills: 3 | COMMUNITY
Start: 2019-01-16

## 2019-02-11 RX ORDER — DESVENLAFAXINE 25 MG/1
1 TABLET, EXTENDED RELEASE ORAL DAILY
Refills: 1 | COMMUNITY
Start: 2019-02-05 | End: 2019-08-12

## 2019-02-11 RX ORDER — AMOXICILLIN AND CLAVULANATE POTASSIUM 875; 125 MG/1; MG/1
1 TABLET, FILM COATED ORAL 2 TIMES DAILY
Qty: 14 TABLET | Refills: 0 | Status: SHIPPED | OUTPATIENT
Start: 2019-02-11 | End: 2019-02-18

## 2019-02-11 RX ORDER — FLUTICASONE PROPIONATE 50 MCG
1 SPRAY, SUSPENSION (ML) NASAL DAILY
Qty: 1 BOTTLE | Refills: 0 | Status: SHIPPED | OUTPATIENT
Start: 2019-02-11 | End: 2019-03-10 | Stop reason: SDUPTHER

## 2019-02-11 NOTE — PROGRESS NOTES
OFFICE VISIT  Luther Jean 77 y o  male MRN: [id number]      Assessment / Plan:  Diagnoses and all orders for this visit:    Acute non-recurrent frontal sinusitis  -     amoxicillin-clavulanate (AUGMENTIN) 875-125 mg per tablet; Take 1 tablet by mouth 2 (two) times a day for 7 days  -     fluticasone (FLONASE) 50 mcg/act nasal spray; 1 spray into each nostril daily    Other orders  -     omeprazole (PriLOSEC) 20 mg delayed release capsule; Take 20 mg by mouth 2 (two) times a day  -     Desvenlafaxine Succinate ER 25 MG TB24; Take 1 tablet by mouth daily  -     glucose blood test strip; Once daily - non insulin treated medication regulation  e11 9  -     ACCU-CHEK SOFTCLIX LANCETS lancets; by Does not apply route      You have been prescribed an antibiotic  This medication is used to treat bacterial infections  Follow the directions as prescribed  Do not share this medication with anyone  Do not stop taking her medication until it is finished, even if you are feeling better  Taking medication with a full glass of water  Call the office if you experience any possible side effects  I recommend that the patient takes an over the counter probiotic or eats yogurt with live cultures in it [place]) to keep good bacteria in the gut and help prevent diarrhea  Wash hands frequently to prevent the spread of infection  Ibuprofen and/or tylenol as needed for pain or fever  If not improving over the next 7-10 days, call office  Follow up in 6 months for well visit  Reason For Visit / Chief Complaint  Chief Complaint   Patient presents with    Sore Throat    Sinus Problem        HPI:  Luther Jean is a 77 y o  male who presents today for acute sick visit  He reports being sick for 3 weeks  He reports starting with a head cold  He was using otc medication with no improvement  Has worsening sore throat, congestion, simus pain and pressure  He has chills  He reports being around ill contacts          Historical Information   Past Medical History:   Diagnosis Date    Anxiety     Chronic kidney disease     kidney stones    Diabetes mellitus ([address] )     High cholesterol     Hypertension      Past Surgical History:   Procedure Laterality Date    APPENDECTOMY      CHOLECYSTECTOMY      [place] CYSTO/URETERO W/LITHOTRIPSY &INDWELL STENT INSRT Right 2/3/2016    Procedure: CYSTOSCOPY; URETEROSCOPY; HOLMIUM LASER LITHOTRIPSY; BASKET STONE EXTRACTION; RETROGRADE PYELOGRAM; STENT PLACEMENT ;  Surgeon: Rupali Knight MD;  [facility];  Service: Urology     Social History   Social History     Substance and Sexual Activity   Alcohol Use No     Social History     Substance and Sexual Activity   Drug Use No     Social History     Tobacco Use   Smoking Status Never Smoker   Smokeless Tobacco Never Used     No family history on file  Meds/Allergies   No Known Allergies    Meds:    Current Outpatient Medications:     ACCU-CHEK SOFTCLIX LANCETS lancets, by Does not apply route, Disp: , Rfl:     glucose blood test strip, Once daily - non insulin treated medication regulation  e11 9, Disp: , Rfl:     ALPRAZolam (XANAX) 0 5 mg tablet, Take 0 5 mg by mouth 2 (two) times a day , Disp: , Rfl:     amoxicillin-clavulanate (AUGMENTIN) 875-125 mg per tablet, Take 1 tablet by mouth 2 (two) times a day for 7 days, Disp: 14 tablet, Rfl: 0    aspirin 81 MG tablet, Take 81 mg by mouth daily  , Disp: , Rfl:     atorvastatin (LIPITOR) 80 mg tablet, Take 1 tablet (80 mg total) by mouth daily, Disp: 90 tablet, Rfl: 2    Desvenlafaxine Succinate ER 25 MG TB24, Take 1 tablet by mouth daily, Disp: , Rfl: 1    enalapril (VASOTEC) 20 mg tablet, Take 1 tablet (20 mg total) by mouth daily, Disp: 90 tablet, Rfl: 2    famotidine (PEPCID) 40 MG tablet, Take 1 tablet (40 mg total) by mouth 2 (two) times a day, Disp: 180 tablet, Rfl: 2    fluticasone (FLONASE) 50 mcg/act nasal spray, 1 spray into each nostril daily, Disp: 1 Bottle, Rfl: 0   glimepiride (AMARYL) 2 mg tablet, Take 4 mg by mouth every evening , Disp: , Rfl:     metFORMIN (GLUCOPHAGE) 500 mg tablet, Take 500 mg by mouth Indications: 1 tab in am and 2 tabs in evening , Disp: , Rfl:     Multiple Vitamin (MULTIVITAMIN) tablet, Take 1 tablet by mouth daily  , Disp: , Rfl:     omeprazole (PriLOSEC) 20 mg delayed release capsule, Take 20 mg by mouth 2 (two) times a day, Disp: , Rfl: 3    sitaGLIPtin (JANUVIA) 100 mg tablet, Take 100 mg by mouth daily  , Disp: , Rfl:       REVIEW OF SYSTEMS  Review of Systems   Constitutional: Negative for activity change, chills, fatigue and fever  HENT: Positive for congestion, sinus pressure, sinus pain and sore throat  Negative for ear discharge, ear pain, tinnitus and trouble swallowing  Eyes: Negative for photophobia, pain, discharge, itching and visual disturbance  Respiratory: Negative for cough, chest tightness, shortness of breath and wheezing  Cardiovascular: Negative for chest pain and leg swelling  Gastrointestinal: Negative for abdominal distention, abdominal pain, constipation, diarrhea, nausea and vomiting  Endocrine: Negative for polydipsia, polyphagia and polyuria  Genitourinary: Negative for dysuria and frequency  Musculoskeletal: Negative for arthralgias, myalgias, neck pain and neck stiffness  Skin: Negative for color change  Neurological: Negative for dizziness, syncope, weakness, numbness and headaches  Hematological: Does not bruise/bleed easily  Psychiatric/Behavioral: Negative for behavioral problems, confusion, self-injury, sleep disturbance and suicidal ideas  The patient is not nervous/anxious              Current Vitals:   Blood Pressure: 138/80 (02/11/19 1016)  Pulse: 88 (02/11/19 1016)  Temperature: (!) 97 4 °F (36 3 °C) (02/11/19 1016)  Respirations: 16 (02/11/19 1016)  Height: 5' 8" (172 7 cm) (02/11/19 1016)  Weight - Scale: 102 kg (224 lb) (02/11/19 1016)  SpO2: 99 % (02/11/19 1016)  [unfilled]    PHYSICAL EXAMS:  Physical Exam   Constitutional: He is oriented to person, place, and time  He appears well-developed and well-nourished  HENT:   Head: Normocephalic and atraumatic  Right Ear: External ear normal    Left Ear: External ear normal    Nose: Right sinus exhibits frontal sinus tenderness  Left sinus exhibits frontal sinus tenderness  Mouth/Throat: Posterior oropharyngeal erythema present  Eyes: Pupils are equal, round, and reactive to light  Conjunctivae are normal  Right eye exhibits no discharge  Left eye exhibits no discharge  Neck: Normal range of motion  Neck supple  No thyromegaly present  Cardiovascular: Normal rate, regular rhythm and normal heart sounds  Pulmonary/Chest: Effort normal and breath sounds normal    Abdominal: Soft  Bowel sounds are normal  He exhibits no distension  There is no tenderness  Musculoskeletal: Normal range of motion  He exhibits no edema, tenderness or deformity  Neurological: He is alert and oriented to person, place, and time  Skin: Skin is warm and dry  No rash noted  No erythema  Psychiatric: He has a normal mood and affect  His behavior is normal            Follow up at this office in if not better, follow up in 6 months for routine check up  Counseling / Coordination of Care  Total floor / unit time spent today 20 minutes  Greater than 50% of total time was spent with the patient and / or family counseling and / or coordination of care

## 2019-02-26 ENCOUNTER — HOSPITAL ENCOUNTER (INPATIENT)
Facility: HOSPITAL | Age: 69
LOS: 1 days | Discharge: HOME | DRG: 641 | End: 2019-02-27
Attending: EMERGENCY MEDICINE | Admitting: INTERNAL MEDICINE
Payer: COMMERCIAL

## 2019-02-26 ENCOUNTER — APPOINTMENT (EMERGENCY)
Dept: RADIOLOGY | Facility: HOSPITAL | Age: 69
DRG: 641 | End: 2019-02-26
Payer: COMMERCIAL

## 2019-02-26 DIAGNOSIS — R07.9 CHEST PAIN: Primary | ICD-10-CM

## 2019-02-26 DIAGNOSIS — E83.42 HYPOMAGNESEMIA: ICD-10-CM

## 2019-02-26 LAB
BASOPHILS # BLD AUTO: 0.05 THOUSANDS/ΜL (ref 0–0.1)
BASOPHILS NFR BLD AUTO: 1 % (ref 0–1)
EOSINOPHIL # BLD AUTO: 0.44 THOUSAND/ΜL (ref 0–0.61)
EOSINOPHIL NFR BLD AUTO: 4 % (ref 0–6)
ERYTHROCYTE [DISTWIDTH] IN BLOOD BY AUTOMATED COUNT: 13.2 % (ref 11.6–15.1)
HCT VFR BLD AUTO: 36.8 % (ref 36.5–49.3)
HGB BLD-MCNC: 12 G/DL (ref 12–17)
IMM GRANULOCYTES # BLD AUTO: 0.04 THOUSAND/UL (ref 0–0.2)
IMM GRANULOCYTES NFR BLD AUTO: 0 % (ref 0–2)
LYMPHOCYTES # BLD AUTO: 2.19 THOUSANDS/ΜL (ref 0.6–4.47)
LYMPHOCYTES NFR BLD AUTO: 22 % (ref 14–44)
MCH RBC QN AUTO: 30.9 PG (ref 26.8–34.3)
MCHC RBC AUTO-ENTMCNC: 32.6 G/DL (ref 31.4–37.4)
MCV RBC AUTO: 95 FL (ref 82–98)
MONOCYTES # BLD AUTO: 0.92 THOUSAND/ΜL (ref 0.17–1.22)
MONOCYTES NFR BLD AUTO: 9 % (ref 4–12)
NEUTROPHILS # BLD AUTO: 6.27 THOUSANDS/ΜL (ref 1.85–7.62)
NEUTS SEG NFR BLD AUTO: 64 % (ref 43–75)
NRBC BLD AUTO-RTO: 0 /100 WBCS
PLATELET # BLD AUTO: 267 THOUSANDS/UL (ref 149–390)
PMV BLD AUTO: 9.1 FL (ref 8.9–12.7)
RBC # BLD AUTO: 3.88 MILLION/UL (ref 3.88–5.62)
WBC # BLD AUTO: 9.91 THOUSAND/UL (ref 4.31–10.16)

## 2019-02-26 PROCEDURE — 80047 BASIC METABLC PNL IONIZED CA: CPT

## 2019-02-26 PROCEDURE — 85025 COMPLETE CBC W/AUTO DIFF WBC: CPT

## 2019-02-26 PROCEDURE — 71045 X-RAY EXAM CHEST 1 VIEW: CPT

## 2019-02-26 PROCEDURE — 83735 ASSAY OF MAGNESIUM: CPT | Performed by: EMERGENCY MEDICINE

## 2019-02-26 PROCEDURE — 85014 HEMATOCRIT: CPT

## 2019-02-26 PROCEDURE — 85610 PROTHROMBIN TIME: CPT | Performed by: EMERGENCY MEDICINE

## 2019-02-26 PROCEDURE — 83690 ASSAY OF LIPASE: CPT | Performed by: EMERGENCY MEDICINE

## 2019-02-26 PROCEDURE — 36415 COLL VENOUS BLD VENIPUNCTURE: CPT

## 2019-02-26 PROCEDURE — 84484 ASSAY OF TROPONIN QUANT: CPT

## 2019-02-26 PROCEDURE — 80053 COMPREHEN METABOLIC PANEL: CPT

## 2019-02-26 PROCEDURE — 99285 EMERGENCY DEPT VISIT HI MDM: CPT

## 2019-02-26 PROCEDURE — 93005 ELECTROCARDIOGRAM TRACING: CPT

## 2019-02-26 RX ORDER — ONDANSETRON 2 MG/ML
4 INJECTION INTRAMUSCULAR; INTRAVENOUS ONCE
Status: DISCONTINUED | OUTPATIENT
Start: 2019-02-27 | End: 2019-02-27 | Stop reason: HOSPADM

## 2019-02-26 RX ORDER — NITROGLYCERIN 0.4 MG/1
0.4 TABLET SUBLINGUAL
Status: DISCONTINUED | OUTPATIENT
Start: 2019-02-26 | End: 2019-02-27 | Stop reason: HOSPADM

## 2019-02-27 ENCOUNTER — APPOINTMENT (INPATIENT)
Dept: NUCLEAR MEDICINE | Facility: HOSPITAL | Age: 69
DRG: 641 | End: 2019-02-27
Payer: COMMERCIAL

## 2019-02-27 ENCOUNTER — APPOINTMENT (OUTPATIENT)
Dept: NUCLEAR MEDICINE | Facility: HOSPITAL | Age: 69
DRG: 641 | End: 2019-02-27
Payer: COMMERCIAL

## 2019-02-27 ENCOUNTER — APPOINTMENT (OUTPATIENT)
Dept: NON INVASIVE DIAGNOSTICS | Facility: HOSPITAL | Age: 69
DRG: 641 | End: 2019-02-27
Payer: COMMERCIAL

## 2019-02-27 ENCOUNTER — APPOINTMENT (EMERGENCY)
Dept: CT IMAGING | Facility: HOSPITAL | Age: 69
DRG: 641 | End: 2019-02-27
Payer: COMMERCIAL

## 2019-02-27 VITALS
SYSTOLIC BLOOD PRESSURE: 151 MMHG | OXYGEN SATURATION: 98 % | DIASTOLIC BLOOD PRESSURE: 82 MMHG | TEMPERATURE: 97.2 F | HEIGHT: 68 IN | HEART RATE: 87 BPM | RESPIRATION RATE: 16 BRPM | WEIGHT: 227.29 LBS | BODY MASS INDEX: 34.45 KG/M2

## 2019-02-27 PROBLEM — R07.89 OTHER CHEST PAIN: Status: ACTIVE | Noted: 2019-02-27

## 2019-02-27 PROBLEM — E83.42 HYPOMAGNESEMIA: Status: ACTIVE | Noted: 2019-02-27

## 2019-02-27 PROBLEM — E78.6 LOW HDL (UNDER 40): Status: ACTIVE | Noted: 2019-02-27

## 2019-02-27 PROBLEM — E78.1 HYPERTRIGLYCERIDEMIA: Status: ACTIVE | Noted: 2019-02-27

## 2019-02-27 LAB
ALBUMIN SERPL BCP-MCNC: 3.8 G/DL (ref 3.5–5)
ALP SERPL-CCNC: 76 U/L (ref 46–116)
ALT SERPL W P-5'-P-CCNC: 37 U/L (ref 12–78)
ANION GAP SERPL CALCULATED.3IONS-SCNC: 7 MMOL/L (ref 4–13)
ANION GAP SERPL CALCULATED.3IONS-SCNC: 8 MMOL/L (ref 4–13)
AST SERPL W P-5'-P-CCNC: 16 U/L (ref 5–45)
ATRIAL RATE: 78 BPM
ATRIAL RATE: 80 BPM
ATRIAL RATE: 94 BPM
BILIRUB SERPL-MCNC: 0.3 MG/DL (ref 0.2–1)
BUN SERPL-MCNC: 31 MG/DL (ref 5–25)
BUN SERPL-MCNC: 35 MG/DL (ref 5–25)
CALCIUM SERPL-MCNC: 8.7 MG/DL (ref 8.3–10.1)
CALCIUM SERPL-MCNC: 8.9 MG/DL (ref 8.3–10.1)
CHEST PAIN STATEMENT: NORMAL
CHLORIDE SERPL-SCNC: 103 MMOL/L (ref 100–108)
CHLORIDE SERPL-SCNC: 105 MMOL/L (ref 100–108)
CHOLEST SERPL-MCNC: 127 MG/DL (ref 50–200)
CO2 SERPL-SCNC: 27 MMOL/L (ref 21–32)
CO2 SERPL-SCNC: 28 MMOL/L (ref 21–32)
CREAT SERPL-MCNC: 1.49 MG/DL (ref 0.6–1.3)
CREAT SERPL-MCNC: 1.67 MG/DL (ref 0.6–1.3)
ERYTHROCYTE [DISTWIDTH] IN BLOOD BY AUTOMATED COUNT: 13.2 % (ref 11.6–15.1)
EST. AVERAGE GLUCOSE BLD GHB EST-MCNC: 148 MG/DL
GFR SERPL CREATININE-BSD FRML MDRD: 41 ML/MIN/1.73SQ M
GFR SERPL CREATININE-BSD FRML MDRD: 47 ML/MIN/1.73SQ M
GLUCOSE SERPL-MCNC: 104 MG/DL (ref 65–140)
GLUCOSE SERPL-MCNC: 134 MG/DL (ref 65–140)
GLUCOSE SERPL-MCNC: 151 MG/DL (ref 65–140)
GLUCOSE SERPL-MCNC: 95 MG/DL (ref 65–140)
HBA1C MFR BLD: 6.8 % (ref 4.2–6.3)
HCT VFR BLD AUTO: 33.8 % (ref 36.5–49.3)
HDLC SERPL-MCNC: 28 MG/DL (ref 40–60)
HGB BLD-MCNC: 10.9 G/DL (ref 12–17)
INR PPP: 0.99 (ref 0.86–1.17)
LDLC SERPL CALC-MCNC: 67 MG/DL (ref 0–100)
LIPASE SERPL-CCNC: 239 U/L (ref 73–393)
MAGNESIUM SERPL-MCNC: 1.4 MG/DL (ref 1.6–2.6)
MAGNESIUM SERPL-MCNC: 1.7 MG/DL (ref 1.6–2.6)
MAX DIASTOLIC BP: 84 MMHG
MAX HEART RATE: 148 BPM
MAX PREDICTED HEART RATE: 150 BPM
MAX. SYSTOLIC BP: 204 MMHG
MCH RBC QN AUTO: 30.6 PG (ref 26.8–34.3)
MCHC RBC AUTO-ENTMCNC: 32.2 G/DL (ref 31.4–37.4)
MCV RBC AUTO: 95 FL (ref 82–98)
P AXIS: 52 DEGREES
P AXIS: 55 DEGREES
P AXIS: 57 DEGREES
PHOSPHATE SERPL-MCNC: 3.6 MG/DL (ref 2.3–4.1)
PLATELET # BLD AUTO: 242 THOUSANDS/UL (ref 149–390)
PMV BLD AUTO: 9.2 FL (ref 8.9–12.7)
POTASSIUM SERPL-SCNC: 4 MMOL/L (ref 3.5–5.3)
POTASSIUM SERPL-SCNC: 4.1 MMOL/L (ref 3.5–5.3)
PR INTERVAL: 158 MS
PR INTERVAL: 160 MS
PR INTERVAL: 162 MS
PROT SERPL-MCNC: 7.4 G/DL (ref 6.4–8.2)
PROTHROMBIN TIME: 12.6 SECONDS (ref 11.8–14.2)
PROTOCOL NAME: NORMAL
QRS AXIS: -21 DEGREES
QRS AXIS: -30 DEGREES
QRS AXIS: -44 DEGREES
QRSD INTERVAL: 104 MS
QRSD INTERVAL: 110 MS
QRSD INTERVAL: 98 MS
QT INTERVAL: 382 MS
QT INTERVAL: 392 MS
QT INTERVAL: 404 MS
QTC INTERVAL: 452 MS
QTC INTERVAL: 460 MS
QTC INTERVAL: 477 MS
RBC # BLD AUTO: 3.56 MILLION/UL (ref 3.88–5.62)
REASON FOR TERMINATION: NORMAL
SODIUM SERPL-SCNC: 139 MMOL/L (ref 136–145)
SODIUM SERPL-SCNC: 139 MMOL/L (ref 136–145)
T WAVE AXIS: 15 DEGREES
T WAVE AXIS: 17 DEGREES
T WAVE AXIS: 39 DEGREES
TARGET HR FORMULA: NORMAL
TEST INDICATION: NORMAL
TIME IN EXERCISE PHASE: NORMAL
TRIGL SERPL-MCNC: 160 MG/DL
TROPONIN I SERPL-MCNC: <0.02 NG/ML
VENTRICULAR RATE: 78 BPM
VENTRICULAR RATE: 80 BPM
VENTRICULAR RATE: 94 BPM
WBC # BLD AUTO: 8.05 THOUSAND/UL (ref 4.31–10.16)

## 2019-02-27 PROCEDURE — 83036 HEMOGLOBIN GLYCOSYLATED A1C: CPT | Performed by: NURSE PRACTITIONER

## 2019-02-27 PROCEDURE — 96361 HYDRATE IV INFUSION ADD-ON: CPT

## 2019-02-27 PROCEDURE — 96365 THER/PROPH/DIAG IV INF INIT: CPT

## 2019-02-27 PROCEDURE — 84100 ASSAY OF PHOSPHORUS: CPT | Performed by: NURSE PRACTITIONER

## 2019-02-27 PROCEDURE — A9502 TC99M TETROFOSMIN: HCPCS

## 2019-02-27 PROCEDURE — 93018 CV STRESS TEST I&R ONLY: CPT | Performed by: INTERNAL MEDICINE

## 2019-02-27 PROCEDURE — 93005 ELECTROCARDIOGRAM TRACING: CPT

## 2019-02-27 PROCEDURE — 93016 CV STRESS TEST SUPVJ ONLY: CPT | Performed by: INTERNAL MEDICINE

## 2019-02-27 PROCEDURE — 93306 TTE W/DOPPLER COMPLETE: CPT | Performed by: INTERNAL MEDICINE

## 2019-02-27 PROCEDURE — 74175 CTA ABDOMEN W/CONTRAST: CPT

## 2019-02-27 PROCEDURE — 83735 ASSAY OF MAGNESIUM: CPT | Performed by: NURSE PRACTITIONER

## 2019-02-27 PROCEDURE — 78452 HT MUSCLE IMAGE SPECT MULT: CPT

## 2019-02-27 PROCEDURE — 82948 REAGENT STRIP/BLOOD GLUCOSE: CPT

## 2019-02-27 PROCEDURE — 93306 TTE W/DOPPLER COMPLETE: CPT

## 2019-02-27 PROCEDURE — 71275 CT ANGIOGRAPHY CHEST: CPT

## 2019-02-27 PROCEDURE — 93010 ELECTROCARDIOGRAM REPORT: CPT | Performed by: INTERNAL MEDICINE

## 2019-02-27 PROCEDURE — 99238 HOSP IP/OBS DSCHRG MGMT 30/<: CPT | Performed by: PHYSICIAN ASSISTANT

## 2019-02-27 PROCEDURE — 93017 CV STRESS TEST TRACING ONLY: CPT

## 2019-02-27 PROCEDURE — 85027 COMPLETE CBC AUTOMATED: CPT | Performed by: NURSE PRACTITIONER

## 2019-02-27 PROCEDURE — 80048 BASIC METABOLIC PNL TOTAL CA: CPT | Performed by: NURSE PRACTITIONER

## 2019-02-27 PROCEDURE — 84484 ASSAY OF TROPONIN QUANT: CPT | Performed by: NURSE PRACTITIONER

## 2019-02-27 PROCEDURE — 99253 IP/OBS CNSLTJ NEW/EST LOW 45: CPT | Performed by: INTERNAL MEDICINE

## 2019-02-27 PROCEDURE — 99220 PR INITIAL OBSERVATION CARE/DAY 70 MINUTES: CPT | Performed by: NURSE PRACTITIONER

## 2019-02-27 PROCEDURE — 80061 LIPID PANEL: CPT | Performed by: NURSE PRACTITIONER

## 2019-02-27 PROCEDURE — 78452 HT MUSCLE IMAGE SPECT MULT: CPT | Performed by: INTERNAL MEDICINE

## 2019-02-27 RX ORDER — PANTOPRAZOLE SODIUM 40 MG/1
40 TABLET, DELAYED RELEASE ORAL
Status: DISCONTINUED | OUTPATIENT
Start: 2019-02-27 | End: 2019-02-27 | Stop reason: HOSPADM

## 2019-02-27 RX ORDER — MAGNESIUM SULFATE 1 G/100ML
1 INJECTION INTRAVENOUS ONCE
Status: COMPLETED | OUTPATIENT
Start: 2019-02-27 | End: 2019-02-27

## 2019-02-27 RX ORDER — ASPIRIN 81 MG/1
81 TABLET, CHEWABLE ORAL DAILY
Status: DISCONTINUED | OUTPATIENT
Start: 2019-02-27 | End: 2019-02-27 | Stop reason: HOSPADM

## 2019-02-27 RX ORDER — HEPARIN SODIUM 5000 [USP'U]/ML
5000 INJECTION, SOLUTION INTRAVENOUS; SUBCUTANEOUS EVERY 8 HOURS SCHEDULED
Status: DISCONTINUED | OUTPATIENT
Start: 2019-02-27 | End: 2019-02-27 | Stop reason: HOSPADM

## 2019-02-27 RX ORDER — MAGNESIUM SULFATE HEPTAHYDRATE 40 MG/ML
2 INJECTION, SOLUTION INTRAVENOUS ONCE
Status: COMPLETED | OUTPATIENT
Start: 2019-02-27 | End: 2019-02-27

## 2019-02-27 RX ORDER — DESVENLAFAXINE 25 MG/1
1 TABLET, EXTENDED RELEASE ORAL DAILY
Status: DISCONTINUED | OUTPATIENT
Start: 2019-02-27 | End: 2019-02-27

## 2019-02-27 RX ORDER — ALBUTEROL SULFATE 2.5 MG/3ML
2.5 SOLUTION RESPIRATORY (INHALATION) EVERY 4 HOURS PRN
Status: DISCONTINUED | OUTPATIENT
Start: 2019-02-27 | End: 2019-02-27 | Stop reason: HOSPADM

## 2019-02-27 RX ORDER — FAMOTIDINE 20 MG/1
40 TABLET, FILM COATED ORAL 2 TIMES DAILY
Status: DISCONTINUED | OUTPATIENT
Start: 2019-02-27 | End: 2019-02-27

## 2019-02-27 RX ORDER — LISINOPRIL 20 MG/1
40 TABLET ORAL DAILY
Status: DISCONTINUED | OUTPATIENT
Start: 2019-02-27 | End: 2019-02-27

## 2019-02-27 RX ORDER — ATORVASTATIN CALCIUM 40 MG/1
80 TABLET, FILM COATED ORAL DAILY
Status: DISCONTINUED | OUTPATIENT
Start: 2019-02-27 | End: 2019-02-27 | Stop reason: HOSPADM

## 2019-02-27 RX ORDER — ALPRAZOLAM 0.5 MG/1
0.5 TABLET ORAL 2 TIMES DAILY
Status: DISCONTINUED | OUTPATIENT
Start: 2019-02-27 | End: 2019-02-27 | Stop reason: HOSPADM

## 2019-02-27 RX ORDER — FLUTICASONE PROPIONATE 50 MCG
1 SPRAY, SUSPENSION (ML) NASAL DAILY
Status: DISCONTINUED | OUTPATIENT
Start: 2019-02-27 | End: 2019-02-27

## 2019-02-27 RX ADMIN — MAGNESIUM SULFATE HEPTAHYDRATE 1 G: 1 INJECTION, SOLUTION INTRAVENOUS at 11:42

## 2019-02-27 RX ADMIN — ASPIRIN 81 MG 81 MG: 81 TABLET ORAL at 11:40

## 2019-02-27 RX ADMIN — SODIUM CHLORIDE 1000 ML: 0.9 INJECTION, SOLUTION INTRAVENOUS at 00:32

## 2019-02-27 RX ADMIN — HEPARIN SODIUM 5000 UNITS: 5000 INJECTION, SOLUTION INTRAVENOUS; SUBCUTANEOUS at 06:35

## 2019-02-27 RX ADMIN — ATORVASTATIN CALCIUM 80 MG: 40 TABLET, FILM COATED ORAL at 11:40

## 2019-02-27 RX ADMIN — PANTOPRAZOLE SODIUM 40 MG: 40 TABLET, DELAYED RELEASE ORAL at 06:34

## 2019-02-27 RX ADMIN — IOHEXOL 100 ML: 350 INJECTION, SOLUTION INTRAVENOUS at 00:28

## 2019-02-27 RX ADMIN — SITAGLIPTIN 100 MG: 100 TABLET, FILM COATED ORAL at 11:40

## 2019-02-27 RX ADMIN — MAGNESIUM SULFATE HEPTAHYDRATE 2 G: 40 INJECTION, SOLUTION INTRAVENOUS at 01:36

## 2019-02-27 RX ADMIN — ALPRAZOLAM 0.5 MG: 0.5 TABLET ORAL at 11:40

## 2019-02-27 RX ADMIN — HEPARIN SODIUM 5000 UNITS: 5000 INJECTION, SOLUTION INTRAVENOUS; SUBCUTANEOUS at 14:01

## 2019-02-27 NOTE — H&P
H&P- Minus Joslyn [birth year], 77 y o  male MRN: [id number]    Unit/Bed#: [facility] Encounter: [id number]    Primary Care Provider: Martir Potter   Date and time admitted to hospital: 2/26/2019 11:21 PM        Other chest pain  Assessment & Plan  Patient states had upper abdominal pain that radiated into left chest and up to the left shoulder  Trend troponin with EKGs- 1/3 troponin --negative and EKG-normal sinus rhythm  Admit to med surge with telemetry  Cardiology consult  Cardiac diet-stress diet  Echo ordered  Nuclear med stress test ordered    Benign essential hypertension  Assessment & Plan  Blood pressure currently mildly elevated  Admit to med surge with telemetry  Monitor per protocol  Patient reports that he was to start a new blood pressure medication on 02/26/2019 and stop lisinopril HCTZ-patient reports stopping of the medication and denies starting the new blood pressure medication-does not know the name of this new medication-states the pharmacy did not have the medication  Esophageal reflux  Assessment & Plan  Continue prior to admission medication  Provide supportive care    Diabetes mellitus [facility])  Assessment & Plan  Lab Results   Component Value Date    HGBA1C 6 3 12/06/2017       No results for input(s): POCGLU in the last 72 hours  Blood Sugar Average: Last 72 hrs:       Collect new A1c  Continue PTA Januvia  ACCU checks ACHS  ISS      VTE Prophylaxis: Heparin  / sequential compression device   Code Status: FULL  POLST: POLST form is on file already (pre-hospital)    Anticipated Length of Stay:  Patient will be admitted on an Observation basis with an anticipated length of stay of  Less than  2 midnights  Justification for Hospital Stay: chest pain , r/o NSTEMI    Total Time for Visit, including Counseling / Coordination of Care: 1 hour  Greater than 50% of this total time spent on direct patient counseling and coordination of care      Chief Complaint:   Upper abdominal pain with radiation to the left chest and left shoulder  History of Present Illness:    Gurjit Newell is a 77 y o  male who presented to the emergency room for evaluation of upper abdominal pain and chest pain with radiation to the left shoulder  Patient admits to chest pain some shortness of breath with the chest pain about of nausea, denies vomiting denies lightheadedness denies dizziness denies denies edema, dysuria urgency or frequency, hematochezia or melena  Patient states that he started lisinopril HCTZ approximately 8-9 days ago, reports follows with a doctor at [facility]  States he contacted [facility] today requesting to be taken off that and lisinopril HCTZ due to increased thirst and urinary frequency during the night  Patient states that he was instructed to stop taking her medicine and that a new medicine will be called into his pharmacy-unsure of this medication name  States the pharmacy did not have his medicine and that the prescription was never called in  States he did not take any blood pressure medicines on 02/26/2019  Reports having GERD in the past and does not feel that the symptoms that he was having today are related to the GERD  Images and labs were collected in the emergency room-see below  Patient was admitted by emergency room doctor  Review of Systems:    Review of Systems   Constitutional: Positive for fatigue  Cardiovascular: Positive for chest pain  Gastrointestinal: Positive for abdominal pain and nausea  Neurological: Positive for weakness  All other systems reviewed and are negative        Past Medical and Surgical History:     Past Medical History:   Diagnosis Date    Anxiety     Chronic kidney disease     kidney stones    Diabetes mellitus ([address] )     High cholesterol     Hypertension        Past Surgical History:   Procedure Laterality Date    APPENDECTOMY      CHOLECYSTECTOMY      [place] CYSTO/URETERO W/LITHOTRIPSY &INDWELL STENT INSRT Right 2/3/2016    Procedure: CYSTOSCOPY; URETEROSCOPY; HOLMIUM LASER LITHOTRIPSY; BASKET STONE EXTRACTION; RETROGRADE PYELOGRAM; STENT PLACEMENT ;  Surgeon: Makayla Palma MD;  [facility];  Service: Urology       Meds/Allergies:    Prior to Admission medications    Medication Sig Start Date End Date Taking? Authorizing Provider   ACCU-CHEK SOFTCLIX LANCETS lancets by Does not apply route 6/14/18   Historical Provider, MD   ALPRAZolam Evetta Lamont) 0 5 mg tablet Take 0 5 mg by mouth 2 (two) times a day  Historical Provider, MD   aspirin 81 MG tablet Take 81 mg by mouth daily  Historical Provider, MD   atorvastatin (LIPITOR) 80 mg tablet Take 1 tablet (80 mg total) by mouth daily 6/6/18   Demetri Odom PA-C   Desvenlafaxine Succinate ER 25 MG TB24 Take 1 tablet by mouth daily 2/5/19   Historical Provider, MD   enalapril (VASOTEC) 20 mg tablet Take 1 tablet (20 mg total) by mouth daily 6/6/18   Demetri Odom PA-C   famotidine (PEPCID) 40 MG tablet Take 1 tablet (40 mg total) by mouth 2 (two) times a day 6/6/18   Demetri Odom PA-C   fluticasone (FLONASE) 50 mcg/act nasal spray 1 spray into each nostril daily 2/11/19   ROSEMARY Whitehead   glimepiride (AMARYL) 2 mg tablet Take 4 mg by mouth every evening  Historical Provider, MD   glucose blood test strip Once daily - non insulin treated medication regulation  e11 9 6/14/18   Historical Provider, MD   metFORMIN (GLUCOPHAGE) 500 mg tablet Take 500 mg by mouth Indications: 1 tab in am and 2 tabs in evening  Historical Provider, MD   Multiple Vitamin (MULTIVITAMIN) tablet Take 1 tablet by mouth daily  Historical Provider, MD   omeprazole (PriLOSEC) 20 mg delayed release capsule Take 20 mg by mouth 2 (two) times a day 1/16/19   Historical Provider, MD   sitaGLIPtin (JANUVIA) 100 mg tablet Take 100 mg by mouth daily  Historical Provider, MD     I have reviewed home medications with patient personally      Allergies: No Known Allergies    Social History:     Marital Status: /Civil Union   Occupation: retired  Patient Pre-hospital Living Situation: independent  Patient Pre-hospital Level of Mobility: independent  Patient Pre-hospital Diet Restrictions: diabetic  Substance Use History:   Social History     Substance and Sexual Activity   Alcohol Use No     Social History     Tobacco Use   Smoking Status Never Smoker   Smokeless Tobacco Never Used     Social History     Substance and Sexual Activity   Drug Use No       Family History:    History reviewed  No pertinent family history  Physical Exam:     Vitals:   Blood Pressure: 163/77 (02/27/19 0000)  Pulse: 84 (02/27/19 0000)  Temperature: 97 5 °F (36 4 °C) (02/26/19 2323)  Temp Source: Temporal (02/26/19 2323)  Respirations: 14 (02/27/19 0000)  Weight - Scale: 100 kg (220 lb 14 4 oz) (02/26/19 2323)  SpO2: 96 % (02/27/19 0000)    Physical Exam   Constitutional: He is oriented to person, place, and time  He appears well-developed and well-nourished  No distress  HENT:   Head: Normocephalic and atraumatic  Eyes: Pupils are equal, round, and reactive to light  EOM are normal  Right eye exhibits no discharge  Left eye exhibits no discharge  Neck: Normal range of motion  Neck supple  No JVD present  No tracheal deviation present  No thyromegaly present  Cardiovascular: Normal rate, regular rhythm, normal heart sounds and intact distal pulses  Exam reveals no gallop and no friction rub  No murmur heard  Pulmonary/Chest: Effort normal and breath sounds normal  No stridor  No respiratory distress  He has no wheezes  Abdominal: Soft  Bowel sounds are normal  He exhibits no distension  There is no tenderness  There is no guarding  Musculoskeletal: He exhibits no edema, tenderness or deformity  Neurological: He is alert and oriented to person, place, and time  He displays normal reflexes  No cranial nerve deficit  Coordination normal    Skin: Skin is warm and dry  Capillary refill takes more than 3 seconds  No rash noted  He is not diaphoretic  No erythema  No pallor  Psychiatric: He has a normal mood and affect  His behavior is normal  Judgment and thought content normal            Additional Data:     Lab Results: I have personally reviewed pertinent reports  Results from last 7 days   Lab Units 02/26/19  2337   WBC Thousand/uL 9 91   HEMOGLOBIN g/dL 12 0   HEMATOCRIT % 36 8   PLATELETS Thousands/uL 267   NEUTROS PCT % 64   LYMPHS PCT % 22   MONOS PCT % 9   EOS PCT % 4     Results from last 7 days   Lab Units 02/26/19  2337   POTASSIUM mmol/L 4 0   CHLORIDE mmol/L 103   CO2 mmol/L 28   BUN mg/dL 35*   CREATININE mg/dL 1 67*   CALCIUM mg/dL 8 9   ALK PHOS U/L 76   ALT U/L 37   AST U/L 16     Results from last 7 days   Lab Units 02/26/19  2337   INR  0 99       Imaging: I have personally reviewed pertinent reports  X-ray Chest 1 View Portable    Result Date: 2/27/2019  Narrative: CHEST INDICATION:   chest pain  Abdominal pain COMPARISON:  Chest x-ray dated 12/23/2017 EXAM PERFORMED/VIEWS:  XR CHEST PORTABLE FINDINGS: Lung volumes are low but no pneumothorax is seen and the lungs appear grossly clear  The cardiomediastinal silhouette appears unremarkable  The visualized bones appear intact  Impression: Lung volumes are low but no acute cardiopulmonary disease is seen  Correlation with pending CTA of the chest and abdomen recommended  Workstation performed: [id number]     Cta Dissection Protocol Chest And Abdomen    Result Date: 2/27/2019  Narrative: CTA - CHEST AND ABDOMEN  - WITHOUT AND WITH IV CONTRAST INDICATION:   Chest and epigastric pain and dizziness  COMPARISON:  None  TECHNIQUE: CT examination of the chest and abdomen was performed both prior to and after the administration of intravenous contrast   Thin section angiographic arterial phase post contrast technique was used in order to evaluate for aortic dissection    3D reformatted images and volume rendering were performed on an independent workstation  Additionally, axial, sagittal, and coronal 2D reformatted images were created from the source data and submitted for interpretation  Radiation dose length product (DLP) for this visit:  2032  85 mGy-cm   This examination, like all CT scans performed in the [facility], was performed utilizing techniques to minimize radiation dose exposure, including the use of iterative reconstruction and automated exposure control  IV Contrast:  100 mL of iohexol (OMNIPAQUE) Enteric Contrast: Enteric contrast was not administered  FINDINGS: AORTA: No thoracic or abdominal aortic aneurysm or dissection  No stenosis in the common or visualized portions of the external iliac arteries  Minimal calcified plaque at the mesenteric and renal artery origins without significant stenosis  No evidence of proximal pulmonary artery embolus  CHEST LUNGS:  Lungs are clear  There is no tracheal or endobronchial lesion  PLEURA:  No pleural effusion  HEART/GREAT VESSELS:  Normal heart size  MEDIASTINUM AND SHELL:  No lymphadenopathy  CHEST WALL AND LOWER NECK:   Unremarkable  ABDOMEN LIVER/BILIARY TREE:  Unremarkable  GALLBLADDER:  Cholecystectomy  SPLEEN:  Unremarkable  PANCREAS:  Unremarkable  ADRENAL GLANDS:  Unremarkable  KIDNEYS/URETERS:  Nonobstructing 1 mm left renal calculus  No pyelonephritis  VISUALIZED STOMACH, BOWEL AND APPENDIX:  Diverticulosis without evidence of diverticulitis or colitis  No bowel obstruction  Appendix not visualized  VISUALIZED ABDOMINOPELVIC CAVITY:  No ascites or free intraperitoneal air  No lymphadenopathy  ABDOMINAL WALL:  Unremarkable  OSSEOUS STRUCTURES:  No acute fracture or destructive osseous lesion  Impression: 1  No evidence of thoracic or abdominal aortic aneurysm or dissection  2   No acute cardiopulmonary process  3   Nonobstructing 1 mm left renal calculus   Workstation performed: [id number]       EKG, Pathology, and Other Studies Reviewed on Admission:   · EKG: reviewed NSR- RBBB    Allscripts / Epic Records Reviewed: Yes     ** Please Note: This note has been constructed using a voice recognition system   **

## 2019-02-27 NOTE — ASSESSMENT & PLAN NOTE
Patient states had upper abdominal pain that radiated into left chest and up to the left shoulder    Trend troponin with EKGs- 1/3 troponin --negative and EKG-normal sinus rhythm  Admit to med surge with telemetry  Cardiology consult  Cardiac diet-stress diet  Echo ordered  Nuclear med stress test ordered

## 2019-02-27 NOTE — DISCHARGE SUMMARY
Discharge- Sourav Marin [birth year], 77 y o  male MRN: [id number]    Unit/Bed#: 400-01 Encounter: [id number]    Primary Care Provider: Vicki Adams   Date and time admitted to hospital: 2/26/2019 11:21 PM        * Other chest pain  Assessment & Plan  Patient states had upper abdominal pain that radiated into left chest and up to the left shoulder  Troponin's negative x 3 sets  Cardiology consultation appreciated  Cardiac diet-stress diet  Echo:   LEFT VENTRICLE:  Systolic function was normal  Ejection fraction was estimated to be 60 %  There was akinesis of the basal inferior wall(s)  Doppler parameters were consistent with abnormal left ventricular relaxation (grade 1 diastolic dysfunction)      RIGHT VENTRICLE:  The size was normal   Systolic function was normal      TRICUSPID VALVE:  There was mild regurgitation  Estimated peak PA pressure was 37 mmHg  The findings suggest mild pulmonary hypertension  Nuclear med stress:  IMPRESSIONS: No evidence of ischemia after maximal exercise without reproduction of symptoms  There was resting hypertension with a hypertensive blood pressure response to stress  Myocardial perfusion imaging was normal at rest and with stress  Outpatient follow up with PCP within 1 week  Hypomagnesemia  Assessment & Plan  Magnesium noted to be 1 4 on admission  The patient received 2 g IV magnesium   This am magnesium level is 1 7, patient received an additional 1 g IV magnesium prior to discharge  Diabetes mellitus [facility])  Assessment & Plan  Lab Results   Component Value Date    HGBA1C 6 8 (H) 02/27/2019       Recent Labs     02/27/19  0724 02/27/19  1120   POCGLU 104 134       Blood Sugar Average: Last 72 hrs:  (P) 119     Hemoglobin A1c is 6 8  Continue home regimen  Outpatient follow up with PCP  Benign essential hypertension  Assessment & Plan  Blood pressure elevated on admission  Likely due to pain/anxiety     Blood pressure has since improved  Continue home regimen  Outpatient follow up with PCP  Esophageal reflux  Assessment & Plan  Continue prior to admission medication      Low HDL (under 40)  Assessment & Plan  Patient counseling on diet modification and daily exercise  Hypertriglyceridemia  Assessment & Plan  Continue statin therapy  Outpatient follow up with PCP  Discharging Physician / Practitioner: Diana Barreto PA-C  PCP: Tacho Tate  Admission Date:   Admission Orders (From admission, onward)    Ordered        02/27/19 0810  Inpatient Admission  Once     Order ID Start Status   [id number] 02/27/19 0810 Completed          02/27/19 0154  Place in Observation  Once     Order ID Start Status   [id number] 02/27/19 0155 Completed              Discharge Date: 02/27/19    Resolved Problems  Date Reviewed: 2/27/2019    None          Consultations During Hospital Stay:  · Cardiology    Procedures Performed:   · none    Significant Findings / Test Results:   · Nuclear stress test: IMPRESSIONS: No evidence of ischemia after maximal exercise without reproduction of symptoms  There was resting hypertension with a hypertensive blood pressure response to stress  Myocardial perfusion imaging was normal at rest and with stress  · Echocardiogram:   LEFT VENTRICLE:  Systolic function was normal  Ejection fraction was estimated to be 60 %  There was akinesis of the basal inferior wall(s)  Doppler parameters were consistent with abnormal left ventricular relaxation (grade 1 diastolic dysfunction)      RIGHT VENTRICLE:  The size was normal   Systolic function was normal      TRICUSPID VALVE:  There was mild regurgitation  Estimated peak PA pressure was 37 mmHg  The findings suggest mild pulmonary hypertension  · CTA dissection protocol chest and abdomen:   1  No evidence of thoracic or abdominal aortic aneurysm or dissection  2   No acute cardiopulmonary process    3   Nonobstructing 1 mm left renal calculus  Incidental Findings:   · none Test Results Pending at Discharge (will require follow up):   · none     Outpatient Tests Requested:  · none    Complications:  none    Reason for Admission: chest pain    Hospital Course:     Franny Jorgensen is a 77 y o  male patient who originally presented to the hospital on 2/26/2019 due to upper abdominal pain and chest pain with radiation to the left shoulder  Patient admits to chest pain some shortness of breath with the chest pain about of nausea, denies vomiting denies lightheadedness denies dizziness denies denies edema, dysuria urgency or frequency, hematochezia or melena  Patient states that he started lisinopril HCTZ approximately 8-9 days ago, reports follows with a doctor at [facility]  States he contacted [facility] today requesting to be taken off that and lisinopril HCTZ due to increased thirst and urinary frequency during the night  Patient states that he was instructed to stop taking her medicine and that a new medicine will be called into his pharmacy-unsure of this medication name  States the pharmacy did not have his medicine and that the prescription was never called in  States he did not take any blood pressure medicines on 02/26/2019  Reports having GERD in the past and does not feel that the symptoms that he was having today are related to the GERD  The patient, initially admitted to the hospital as inpatient, was discharged earlier than expected given the following: The patients magnesium level increased quickly with IV magnesium supplementation       Please see above list of diagnoses and related plan for additional information       Condition at Discharge: good     Discharge Day Visit / Exam:     Subjective:    Vitals: Blood Pressure: 151/82 (02/27/19 1503)  Pulse: 87 (02/27/19 1503)  Temperature: (!) 97 2 °F (36 2 °C) (02/27/19 1503)  Temp Source: Temporal (02/27/19 1503)  Respirations: 16 (02/27/19 1503)  Height: 5' 8" (172 7 cm) (02/27/19 5438)  Weight - Scale: 103 kg (227 lb 4 7 oz) (02/27/19 0306)  SpO2: 98 % (02/27/19 1503)  Exam:   Physical Exam   Constitutional: He is oriented to person, place, and time  He appears well-developed and well-nourished  HENT:   Head: Normocephalic and atraumatic  Cardiovascular: Normal rate, regular rhythm and normal heart sounds  Pulmonary/Chest: Effort normal and breath sounds normal  No respiratory distress  He has no wheezes  He has no rales  Abdominal: Soft  Bowel sounds are normal  He exhibits no distension  There is no tenderness  Neurological: He is alert and oriented to person, place, and time  No cranial nerve deficit  Skin: Skin is warm and dry  Nursing note and vitals reviewed  Discussion with Family: wife updated at bedside prior to discharge    Discharge instructions/Information to patient and family:   See after visit summary for information provided to patient and family  Provisions for Follow-Up Care:  See after visit summary for information related to follow-up care and any pertinent home health orders  Disposition:     Home    For Discharges to [facility]:   · Not Applicable to this Patient - Not Applicable to this Patient    Planned Readmission: no     Discharge Statement:  I spent 25 minutes discharging the patient  This time was spent on the day of discharge  I had direct contact with the patient on the day of discharge  Greater than 50% of the total time was spent examining patient, answering all patient questions, arranging and discussing plan of care with patient as well as directly providing post-discharge instructions  Additional time then spent on discharge activities  Discharge Medications:  See after visit summary for reconciled discharge medications provided to patient and family        ** Please Note: This note has been constructed using a voice recognition system **

## 2019-02-27 NOTE — CONSULTS
Consultation - Cardiology   Javier Mina 77 y o  male MRN: [id number]  Unit/Bed#: 400-01 Encounter: [id number]  02/27/19  9:58 AM      Assessment:  Principal Problem:    Other chest pain  Active Problems:    Diabetes mellitus ([address] )    Esophageal reflux    Benign essential hypertension    Low HDL (under 40)    Hypomagnesemia    Hypertriglyceridemia    Chief Complaint   Patient presents with    Chest Pain     Patient started with left sided chest pain that radiates into left shoulder  Patient stated his whole entire body feels numb and is tingling  Admitting diagnosis:  Hypomagnesemia [E83 42]  Chest pain [R07 9]    Impression:    77year-old with well-controlled hypertension, diabetes, mild dyslipidemia-on statin, admitted with atypical abdominal/chest pains, with negative troponins and enzymes  He did have a negative stress test in 2016-exercise nuclear perfusion study without ischemia  Plan:    Chest pain:  Mostly abdominal pain that subsequently radiated to the chest, highly atypical, he does have gastroesophageal reflux disease  Will proceed with exercise nuclear perfusion study  I supervised his exercise portion of the study which did not demonstrate any clinical or ECG evidence of ischemia  Hypertension:  Elevated at the time of presentation-199/103, currently better controlled, can add amlodipine for better blood pressure control to his baseline enalapril once his renal function has stabilized  Chronic kidney disease: Unclear what his baseline creatinine is, was 1  Two 2 in 2016, 1 55 in 2017, admitted at 1 65 and subsequently 1 49 I suspect that this is now close to his baseline  Diabetes:  Most recent A1c around 6 3 as per the patient  It was 6 3 in December 2017      Dyslipidemia:  Continue high potency statin      History of Present Illness   Physician Requesting Consult: Natasha Reynoso MD   Reason for Consult / Principal Problem:  Chest pain  HPI: Javier Mina is a 77 y o  year old male with a history of hypertension, diabetes most recent A1c around 6, dyslipidemia-on statin, former tobacco use-quit 20 years ago, family history of coronary artery disease in his father in his 46s although he was also smoker, patient was recently initiated on hydrochlorothiazide but because of side effects, he wanted to go off of it, is on lisinopril otherwise , blood pressure at home was 140s/70s yesterday, had acute onset of abdominal discomfort just above the umbilicus that subsequently radiated up to the left shoulder  This was not associated with any ischemic ECG changes or troponin elevations  On further questioning, he admits to having arthritis symptoms that has limited his physical activity for the last 1 year or so, active [place] without any cardiac symptoms with activity  Consults    Review of Systems:  feels ill and fatigued  Review of Systems   HENT: Negative  Eyes: Negative  Respiratory: Positive for chest tightness  Cardiovascular: Negative  Gastrointestinal: Positive for abdominal pain  Negative for abdominal distention, anal bleeding, blood in stool, constipation, diarrhea, nausea and rectal pain  Endocrine: Negative  Genitourinary: Negative  Musculoskeletal: Negative  Allergic/Immunologic: Negative  Neurological: Negative  Hematological: Negative  Psychiatric/Behavioral: Negative          Historical Information   Past Medical History:   Diagnosis Date    Anxiety     Chronic kidney disease     kidney stones    Diabetes mellitus ([address] )     High cholesterol     Hypertension      Past Surgical History:   Procedure Laterality Date    APPENDECTOMY      CHOLECYSTECTOMY      IA CYSTO/URETERO W/LITHOTRIPSY &INDWELL STENT INSRT Right 2/3/2016    Procedure: CYSTOSCOPY; URETEROSCOPY; HOLMIUM LASER LITHOTRIPSY; BASKET STONE EXTRACTION; RETROGRADE PYELOGRAM; STENT PLACEMENT ;  Surgeon: Brian Mcclelland MD;  Location: [facility];  Service: Urology Social History     Substance and Sexual Activity   Alcohol Use Never    Frequency: Never    Drinks per session: Patient refused    Binge frequency: Never     Social History     Substance and Sexual Activity   Drug Use No    Comment: n/a     Social History     Tobacco Use   Smoking Status Never Smoker   Smokeless Tobacco Never Used     Family History: History reviewed  No pertinent family history    No family history of premature CAD or Sudden Cardiac Death    Meds/Allergies     Current Facility-Administered Medications:     albuterol inhalation solution 2 5 mg, 2 5 mg, Nebulization, Q4H PRN, Meghna Huynh MD    [facility]) tablet 0 5 mg, 0 5 mg, Oral, BID, Radha Y Dawoodank, CRNP    aspirin chewable tablet 81 mg, 81 mg, Oral, Daily, Rdaha Y Dawoodank, CRNP    atorvastatin (LIPITOR) tablet 80 mg, 80 mg, Oral, Daily, Radha Y Dawoodank, CRNP    heparin (porcine) subcutaneous injection 5,000 Units, 5,000 Units, Subcutaneous, Q8H [address], 5,000 Units at 02/27/19 7497 **AND** [CANCELED] Platelet count, , , Once, Radhaingris Seayank, CRNP    insulin lispro (HumaLOG) 100 units/mL subcutaneous injection 1-6 Units, 1-6 Units, Subcutaneous, TID AC **AND** Fingerstick Glucose (POCT), , , TID AC, Radha Seayank, CRNP    insulin lispro (HumaLOG) 100 units/mL subcutaneous injection 1-6 Units, 1-6 Units, Subcutaneous, HS, Radha Y Dawoodank, CRNP    magnesium sulfate IVPB (premix) SOLN 1 g, 1 g, Intravenous, Once, Triston Lock, DO    nitroglycerin (NITROSTAT) SL tablet 0 4 mg, 0 4 mg, Sublingual, Q5 Min PRN, Radha Y Swank, RADHANP, Stopped at 02/27/19 0056    ondansetron [facility]) injection 4 mg, 4 mg, Intravenous, Once, Radha Seayank, CRNP, Stopped at 02/27/19 0045    pantoprazole (PROTONIX) EC tablet 40 mg, 40 mg, Oral, BID AC, Radha Seayank, CRNP, 40 mg at 02/27/19 5447    regadenoson (LEXISCAN) injection 0 4 mg, 0 4 mg, Intravenous, Once, Alysia Diamond MD    sitaGLIPtin (JANUVIA) tablet 100 mg, 100 mg, Oral, Daily, Radha Y Swank, CRNP  No Known Allergies    Objective   Vitals: Blood pressure 136/67, pulse 83, temperature 97 5 °F (36 4 °C), temperature source Temporal, resp  rate 18, height 5' 8" (1 727 m), weight 103 kg (227 lb 4 7 oz), SpO2 98 %  , Body mass index is 34 56 kg/m² ,   Orthostatic Blood Pressures      Most Recent Value   Blood Pressure  136/67 filed at 02/27/2019 6433   Patient Position - Orthostatic VS  Lying filed at 02/27/2019 3984            Intake/Output Summary (Last 24 hours) at 2/27/2019 0958  Last data filed at 2/27/2019 0756  Gross per 24 hour   Intake 1000 ml   Output    Net 1000 ml       Weight (last 2 days)     Date/Time   Weight    02/27/19 0306   103 (227 29)    02/26/19 2323   100 (220 9)              Invasive Devices     Peripheral Intravenous Line            Peripheral IV 02/26/19 Right Antecubital less than 1 day                  Physical Exam:  GEN: Noni Johnson appears well, alert and oriented x 3, pleasant and cooperative   HEENT: pupils equal, round, and reactive to light; extraocular muscles intact  NECK: supple, no carotid bruits or JVD  HEART: regular rhythm, normal S1 and S2, no murmurs, no clicks, gallops or rubs   LUNGS: clear to auscultation bilaterally; no wheezes or rhonchi, no rales  ABDOMEN/GI: normal bowel sounds, soft, no tenderness, no distention  EXTREMITIES/Musculoskeltal: peripheral pulses normal; no clubbing, cyanosis, or edema  NEURO: no focal motor findings   SKIN: normal without suspicious lesions on exposed skin      Lab Results:   Admission on 02/26/2019   Component Date Value    Sodium 02/26/2019 139     Potassium 02/26/2019 4 0     Chloride 02/26/2019 103     CO2 02/26/2019 28     ANION GAP 02/26/2019 8     BUN 02/26/2019 35*    Creatinine 02/26/2019 1 67*    Glucose 02/26/2019 151*    Calcium 02/26/2019 8 9     AST 02/26/2019 16     ALT 02/26/2019 37     Alkaline Phosphatase 02/26/2019 76     Total Protein 02/26/2019 7 4     Albumin 02/26/2019 3 8     Total Bilirubin 02/26/2019 0 30     eGFR 02/26/2019 41     WBC 02/26/2019 9 91     RBC 02/26/2019 3 88     Hemoglobin 02/26/2019 12 0     Hematocrit 02/26/2019 36 8     MCV 02/26/2019 95     MCH 02/26/2019 30 9     MCHC 02/26/2019 32 6     RDW 02/26/2019 13 2     MPV 02/26/2019 9 1     Platelets 18/96/7591 267     nRBC 02/26/2019 0     Neutrophils Relative 02/26/2019 64     Immat GRANS % 02/26/2019 0     Lymphocytes Relative 02/26/2019 22     Monocytes Relative 02/26/2019 9     Eosinophils Relative 02/26/2019 4     Basophils Relative 02/26/2019 1     Neutrophils Absolute 02/26/2019 6 27     Immature Grans Absolute 02/26/2019 0 04     Lymphocytes Absolute 02/26/2019 2 19     Monocytes Absolute 02/26/2019 0 92     Eosinophils Absolute 02/26/2019 0 44     Basophils Absolute 02/26/2019 0 05     Troponin I 02/26/2019 <0 02     Magnesium 02/26/2019 1 4*    Protime 02/26/2019 12 6     INR 02/26/2019 0 99     Lipase 02/26/2019 239     Troponin I 02/27/2019 <0 02     Troponin I 02/27/2019 <0 02     Sodium 02/27/2019 139     Potassium 02/27/2019 4 1     Chloride 02/27/2019 105     CO2 02/27/2019 27     ANION GAP 02/27/2019 7     BUN 02/27/2019 31*    Creatinine 02/27/2019 1 49*    Glucose 02/27/2019 95     Calcium 02/27/2019 8 7     eGFR 02/27/2019 47     Magnesium 02/27/2019 1 7     Phosphorus 02/27/2019 3 6     WBC 02/27/2019 8 05     RBC 02/27/2019 3 56*    Hemoglobin 02/27/2019 10 9*    Hematocrit 02/27/2019 33 8*    MCV 02/27/2019 95     MCH 02/27/2019 30 6     MCHC 02/27/2019 32 2     RDW 02/27/2019 13 2     Platelets 09/86/6082 242     MPV 02/27/2019 9 2     Cholesterol 02/27/2019 127     Triglycerides 02/27/2019 160*    HDL, Direct 02/27/2019 28*    LDL Calculated 02/27/2019 67     POC Glucose 02/27/2019 104     Ventricular Rate 02/26/2019 94     Atrial Rate 02/26/2019 94     FL Interval 02/26/2019 158     QRSD Interval 02/26/2019 110  QT Interval 02/26/2019 382     QTC Interval 02/26/2019 477     P Axis 02/26/2019 52     QRS Axis 02/26/2019 -40     T Wave Axis 02/26/2019 39     Ventricular Rate 02/27/2019 78     Atrial Rate 02/27/2019 78     OH Interval 02/27/2019 162     QRSD Interval 02/27/2019 98     QT Interval 02/27/2019 404     QTC Interval 02/27/2019 460     P Axis 02/27/2019 57     QRS Axis 02/27/2019 -21     T Wave Axis 02/27/2019 17     Ventricular Rate 02/27/2019 80     Atrial Rate 02/27/2019 80     OH Interval 02/27/2019 160     QRSD Interval 02/27/2019 104     QT Interval 02/27/2019 392     QTC Interval 02/27/2019 452     P Axis 02/27/2019 55     QRS Axis 02/27/2019 -30     T Wave Axis 02/27/2019 15          Imaging: I have personally reviewed pertinent reports  EKG/Telemetry:  No events    Counseling / Coordination of Care  Total floor / unit time spent today 35 minutes  Greater than 50% of total time was spent with the patient and / or family counseling and / or coordination of care  We will continue to follow with the patient throughout their hospitalization  Thank you for allowing us to participate in their care     Og Moore MD

## 2019-02-27 NOTE — ASSESSMENT & PLAN NOTE
Patient states had upper abdominal pain that radiated into left chest and up to the left shoulder  Troponin's negative x 3 sets  Cardiology consultation appreciated  Cardiac diet-stress diet  Echo:   LEFT VENTRICLE:  Systolic function was normal  Ejection fraction was estimated to be 60 %  There was akinesis of the basal inferior wall(s)  Doppler parameters were consistent with abnormal left ventricular relaxation (grade 1 diastolic dysfunction)      RIGHT VENTRICLE:  The size was normal   Systolic function was normal      TRICUSPID VALVE:  There was mild regurgitation  Estimated peak PA pressure was 37 mmHg  The findings suggest mild pulmonary hypertension  Nuclear med stress:  IMPRESSIONS: No evidence of ischemia after maximal exercise without reproduction of symptoms  There was resting hypertension with a hypertensive blood pressure response to stress  Myocardial perfusion imaging was normal at rest and with stress  Outpatient follow up with PCP within 1 week

## 2019-02-27 NOTE — ED PROVIDER NOTES
History  Chief Complaint   Patient presents with    Chest Pain     Patient started with left sided chest pain that radiates into left shoulder  Patient stated his whole entire body feels numb and is tingling  History provided by:  Patient, spouse and medical records  Chest Pain   Pain location:  Epigastric  Pain quality: sharp and stabbing    Pain radiates to:  Precordial region  Pain radiates to the back: no    Pain severity:  Severe  Onset quality:  Sudden  Duration:  1 hour  Date/time of last known well:  2/26/2019 11:00 PM  Timing:  Constant  Progression:  Partially resolved  Chronicity:  New (Patient states pain is distinct from that a/w his GERD and previous anxiety attacks)  Context: at rest (Pain woke patient from sleep )    Relieved by:  Nothing  Worsened by:  Nothing tried  Ineffective treatments:  None tried  Associated symptoms: abdominal pain, nausea and weakness (generalized/nonfocal weakness and fatigue  Patient states he has felt generally unwell over the past 7d without chest pain as he has now)    Associated symptoms: no altered mental status, no cough, no dysphagia, no fatigue, no fever, no numbness, no orthopnea, no palpitations, no PND, no shortness of breath, no syncope and not vomiting    Risk factors: diabetes mellitus (NIDDM), high cholesterol, hypertension and male sex    Risk factors: no coronary artery disease (No established hx of CAD; nuclear cardiac stress test in 2017 was normal), no prior DVT/PE, no smoking and no surgery    Risk factors comment:  Initiation of HCTZ-lisinopril within past 10d from lisinopril previously for control of HTN    DDx includes but is not limited to ACS/dissection/ptx/pna/pancreatitis/gastritis/GERD/PUD  There is a non-trivial concern for dissection and I will obtain emergent CTA dissection study of chest/abdomen  Will assess ACS with ekg/troponin initially with plan for admission given non-low-risk sx by HEART score   Ptx/pna will be assessed by chest imaging  Prior to Admission Medications   Prescriptions Last Dose Informant Patient Reported? Taking? ACCU-CHEK SOFTCLIX LANCETS lancets   Yes No   Sig: by Does not apply route   ALPRAZolam (XANAX) 0 5 mg tablet   Yes No   Sig: Take 0 5 mg by mouth 2 (two) times a day  Desvenlafaxine Succinate ER 25 MG TB24   Yes No   Sig: Take 1 tablet by mouth daily   Multiple Vitamin (MULTIVITAMIN) tablet   Yes No   Sig: Take 1 tablet by mouth daily  aspirin 81 MG tablet   Yes No   Sig: Take 81 mg by mouth daily  atorvastatin (LIPITOR) 80 mg tablet   No No   Sig: Take 1 tablet (80 mg total) by mouth daily   enalapril (VASOTEC) 20 mg tablet   No No   Sig: Take 1 tablet (20 mg total) by mouth daily   famotidine (PEPCID) 40 MG tablet   No No   Sig: Take 1 tablet (40 mg total) by mouth 2 (two) times a day   fluticasone (FLONASE) 50 mcg/act nasal spray   No No   Si spray into each nostril daily   glimepiride (AMARYL) 2 mg tablet   Yes No   Sig: Take 4 mg by mouth every evening  glucose blood test strip   Yes No   Sig: Once daily - non insulin treated medication regulation  e11 9   metFORMIN (GLUCOPHAGE) 500 mg tablet   Yes No   Sig: Take 500 mg by mouth Indications: 1 tab in am and 2 tabs in evening  omeprazole (PriLOSEC) 20 mg delayed release capsule   Yes No   Sig: Take 20 mg by mouth 2 (two) times a day   sitaGLIPtin (JANUVIA) 100 mg tablet   Yes No   Sig: Take 100 mg by mouth daily        Facility-Administered Medications: None       Past Medical History:   Diagnosis Date    Anxiety     Chronic kidney disease     kidney stones    Diabetes mellitus ([address] )     High cholesterol     Hypertension        Past Surgical History:   Procedure Laterality Date    APPENDECTOMY      CHOLECYSTECTOMY      [place] CYSTO/URETERO W/LITHOTRIPSY &INDWELL STENT INSRT Right 2/3/2016    Procedure: CYSTOSCOPY; URETEROSCOPY; HOLMIUM LASER LITHOTRIPSY; BASKET STONE EXTRACTION; RETROGRADE PYELOGRAM; STENT PLACEMENT ;  Surgeon: Mamie Gaming MD;  Location: [facility];  Service: Urology       History reviewed  No pertinent family history  I have reviewed and agree with the history as documented  Social History     Tobacco Use    Smoking status: Never Smoker    Smokeless tobacco: Never Used   Substance Use Topics    Alcohol use: No    Drug use: No        Review of Systems   Constitutional: Negative for chills, fatigue and fever  HENT: Negative for trouble swallowing  Respiratory: Positive for chest tightness  Negative for cough and shortness of breath  Cardiovascular: Positive for chest pain  Negative for palpitations, orthopnea, syncope and PND  Gastrointestinal: Positive for abdominal pain and nausea  Negative for diarrhea and vomiting  Skin: Negative for color change, pallor, rash and wound  Neurological: Positive for weakness (generalized/nonfocal weakness and fatigue  Patient states he has felt generally unwell over the past 7d without chest pain as he has now) and light-headedness  Negative for numbness  Hematological: Negative for adenopathy  Does not bruise/bleed easily  All other systems reviewed and are negative  Physical Exam  Physical Exam   Constitutional: He is oriented to person, place, and time  He appears well-developed and well-nourished  He is cooperative  Non-toxic appearance  He does not appear ill  No distress  HENT:   Head: Normocephalic and atraumatic  Right Ear: Hearing and external ear normal    Left Ear: Hearing and external ear normal    Nose: Nose normal    Mouth/Throat: Uvula is midline, oropharynx is clear and moist and mucous membranes are normal  No oropharyngeal exudate  Eyes: Pupils are equal, round, and reactive to light  Conjunctivae, EOM and lids are normal    Neck: Trachea normal, normal range of motion and phonation normal  Neck supple  No JVD present  No tracheal tenderness, no spinous process tenderness and no muscular tenderness present   No tracheal deviation present  No thyroid mass and no thyromegaly present  Cardiovascular: Normal rate, regular rhythm, S1 normal, S2 normal, normal heart sounds and intact distal pulses  Exam reveals no gallop and no friction rub  No murmur heard  Pulses:       Radial pulses are 2+ on the right side, and 2+ on the left side  Dorsalis pedis pulses are 2+ on the right side, and 2+ on the left side  Posterior tibial pulses are 2+ on the right side, and 2+ on the left side  Pulmonary/Chest: Effort normal and breath sounds normal  No stridor  No respiratory distress  He has no decreased breath sounds  He has no wheezes  He has no rhonchi  He has no rales  He exhibits no tenderness  Abdominal: Soft  He exhibits no distension and no mass  There is no tenderness  There is no rigidity, no rebound, no guarding and no CVA tenderness  Musculoskeletal: Normal range of motion  He exhibits no edema, tenderness or deformity  Lymphadenopathy:     He has no cervical adenopathy  Neurological: He is alert and oriented to person, place, and time  He has normal strength  No cranial nerve deficit or sensory deficit  He exhibits normal muscle tone  GCS eye subscore is 4  GCS verbal subscore is 5  GCS motor subscore is 6  PERRLA; EOMI  Sensation intact to light touch over face in V1-V3 distribution bilaterally  Facial expressions symmetric  Tongue/uvula midline  Shoulder shrug equal bilaterally  Strength 5/5 in UE/LE bilaterally  Sensation intact to light touch in UE/LE bilaterally  Skin: Skin is warm, dry and intact  No rash noted  No erythema  Psychiatric: He has a normal mood and affect  His speech is normal and behavior is normal    Nursing note and vitals reviewed        Vital Signs  ED Triage Vitals [02/26/19 2323]   Temperature Pulse Respirations Blood Pressure SpO2   97 5 °F (36 4 °C) 101 18 (!) 199/103 98 %      Temp Source Heart Rate Source Patient Position - Orthostatic VS BP Location FiO2 (%)   Temporal Monitor Lying Left arm --      Pain Score       8           Vitals:    02/26/19 2323 02/27/19 0000   BP: (!) 199/103 163/77   Pulse: 101 84   Patient Position - Orthostatic VS: Lying        Visual Acuity      ED Medications  Medications   ondansetron (ZOFRAN) injection 4 mg (0 mg Intravenous Hold 2/27/19 0045)   nitroglycerin (NITROSTAT) SL tablet 0 4 mg (0 mg Sublingual Hold 2/27/19 0056)   magnesium sulfate 2 g/50 mL IVPB (premix) 2 g (2 g Intravenous New Bag 2/27/19 0136)   sodium chloride 0 9 % bolus 1,000 mL (0 mL Intravenous Stopped 2/27/19 0132)   iohexol (OMNIPAQUE) 350 MG/ML injection (SINGLE-DOSE) 100 mL (100 mL Intravenous Given 2/27/19 0028)       Diagnostic Studies  Results Reviewed     Procedure Component Value Units Date/Time    Magnesium [[id number]]  (Abnormal) Collected:  02/26/19 2337    Lab Status:  Final result Specimen:  Blood from Arm, Right Updated:  02/27/19 0043     Magnesium 1 4 mg/dL     Lipase [[id number]]  (Normal) Collected:  02/26/19 2337    Lab Status:  Final result Specimen:  Blood from Arm, Right Updated:  02/27/19 0043     Lipase 239 u/L     Protime-INR [[id number]]  (Normal) Collected:  02/26/19 2337    Lab Status:  Final result Specimen:  Blood from Arm, Right Updated:  02/27/19 0041     Protime 12 6 seconds      INR 0 99    Troponin I [[id number]]  (Normal) Collected:  02/26/19 2337    Lab Status:  Final result Specimen:  Blood from Arm, Right Updated:  02/27/19 0038     Troponin I <0 02 ng/mL     Comprehensive metabolic panel [[id number]]  (Abnormal) Collected:  02/26/19 2337    Lab Status:  Final result Specimen:  Blood from Arm, Right Updated:  02/27/19 0015     Sodium 139 mmol/L      Potassium 4 0 mmol/L      Chloride 103 mmol/L      CO2 28 mmol/L      ANION GAP 8 mmol/L      BUN 35 mg/dL      Creatinine 1 67 mg/dL      Glucose 151 mg/dL      Calcium 8 9 mg/dL      AST 16 U/L      ALT 37 U/L      Alkaline Phosphatase 76 U/L      Total Protein 7 4 g/dL      Albumin 3 8 g/dL      Total Bilirubin 0 30 mg/dL      eGFR 41 ml/min/1 73sq m     Narrative:       National Kidney Disease Education Program recommendations are as follows:  GFR calculation is accurate only with a steady state creatinine  Chronic Kidney disease less than 60 ml/min/1 73 sq  meters  Kidney failure less than 15 ml/min/1 73 sq  meters  CBC and differential [[id number]] Collected:  02/26/19 2337    Lab Status:  Final result Specimen:  Blood from Arm, Right Updated:  02/26/19 2346     WBC 9 91 Thousand/uL      RBC 3 88 Million/uL      Hemoglobin 12 0 g/dL      Hematocrit 36 8 %      MCV 95 fL      MCH 30 9 pg      MCHC 32 6 g/dL      RDW 13 2 %      MPV 9 1 fL      Platelets 263 Thousands/uL      nRBC 0 /100 WBCs      Neutrophils Relative 64 %      Immat GRANS % 0 %      Lymphocytes Relative 22 %      Monocytes Relative 9 %      Eosinophils Relative 4 %      Basophils Relative 1 %      Neutrophils Absolute 6 27 Thousands/µL      Immature Grans Absolute 0 04 Thousand/uL      Lymphocytes Absolute 2 19 Thousands/µL      Monocytes Absolute 0 92 Thousand/µL      Eosinophils Absolute 0 44 Thousand/µL      Basophils Absolute 0 05 Thousands/µL                  CTA dissection protocol chest and abdomen   Final Result by Hazel Jennings MD (02/27 0046)         1  No evidence of thoracic or abdominal aortic aneurysm or dissection  2   No acute cardiopulmonary process  3   Nonobstructing 1 mm left renal calculus  Workstation performed: [id number]         X-ray chest 1 view portable   ED Interpretation by Radha Hamilton DO (02/27 0050)   Airway is midline  Lungs are clear bilaterally with no evidence of pulmonary vascular congestion/focal infiltrate/pleural effusion//pneumothorax  Cardiac and mediastinal silhouettes are within normal limits  Osseous structures appear normal       Final Result by Talha Dale DO (02/27 0136)      Lung volumes are low but no acute cardiopulmonary disease is seen        Correlation with pending CTA of the chest and abdomen recommended  Workstation performed: [id number]                    Procedures  ECG 12 Lead Documentation  Date/Time: 2/26/2019 11:33 PM  Performed by: Beverly Freedman DO  Authorized by: Beverly Freedman DO     Indications / Diagnosis:  Chest pain  ECG reviewed by me, the ED Provider: yes    Patient location:  ED  Previous ECG:     Previous ECG:  Compared to current    Comparison ECG info:  23 Dec 2017    Similarity:  No change    Comparison to cardiac monitor: Yes    Interpretation:     Interpretation: abnormal    Rate:     ECG rate:  94    ECG rate assessment: normal    Rhythm:     Rhythm: sinus rhythm    Ectopy:     Ectopy: none    QRS:     QRS axis:  Left    QRS intervals:  Normal  Conduction:     Conduction: abnormal      Abnormal conduction: incomplete RBBB    ST segments:     ST segments:  Normal  T waves:     T waves: normal    Other findings:     Other findings: prolonged qTc interval    Comments:      Pr 158 qrs 110 qtc 477           Phone Contacts  ED Phone Contact    ED Course  ED Course as of Feb 27 0202   Wed Feb 27, 2019   0049 1  WBC wnl   2  Hg/Hct wnl   3  Plt wnl   4  Electrolytes: mild hypomagnesemia; will replete IV  BUN/Cr similar to baseline: patient has been receiving IVF bolus following CTA given marginal GFR  5  INR wnl   6  Troponin negative  7  Lipase wnl  CTA reveals no dissection/aortic aneurysm or other acute pathology to account for sx  EKG demonstrates no changes from prior and no underlying ischemic pathology  Troponin negative as noted  Despite the broadly negative workup, the concern for ACS remains given his risk profile and the nature of sx  Will plan to reevaluate with recommendation for admission  0105 All results were noted and d/w patient and his wife  He reports no ongoing chest pain at present but still notes feeling of generalized fatigue/weakness as at time of admission   Advised admission d/t high risk profile and non-low-risk sx by HEART score  Patient agreeable to this  D/w SLIM PA; she is currently busy with a patient on the floor and will come to ED       0155 D/w BAILEY Urrutia in ED  Accepts in observation admission to service of Dr Latoya Lezama  [facility]  Number of Diagnoses or Management Options  Chest pain: new and requires workup  Hypomagnesemia: new and does not require workup     Amount and/or Complexity of Data Reviewed  Clinical lab tests: ordered and reviewed  Tests in the radiology section of CPT®: ordered and reviewed  Decide to obtain previous medical records or to obtain history from someone other than the patient: yes  Obtain history from someone other than the patient: yes  Review and summarize past medical records: yes  Discuss the patient with other providers: yes  Independent visualization of images, tracings, or specimens: yes    Risk of Complications, Morbidity, and/or Mortality  Presenting problems: high  Diagnostic procedures: high  Management options: high    Patient Progress  Patient progress: improved      Disposition  Final diagnoses:   Chest pain     Time reflects when diagnosis was documented in both MDM as applicable and the Disposition within this note     Time User Action Codes Description Comment    2/27/2019  1:55 AM Bar Carson Add [R07 9] Chest pain       ED Disposition     ED Disposition Condition Date/Time Comment    Admit Stable Wed Feb 27, 2019  1:55 AM Case was discussed with [place] and the patient's admission status was agreed to be Admission Status: observation status to the service of Dr Latoya Lezama  Follow-up Information    None         Patient's Medications   Discharge Prescriptions    No medications on file     No discharge procedures on file      ED Provider  Electronically Signed by           Radha Eaton DO  02/27/19 6991

## 2019-02-27 NOTE — ASSESSMENT & PLAN NOTE
Blood pressure currently mildly elevated  Admit to [facility] with telemetry  Monitor per protocol  Patient reports that he was to start a new blood pressure medication on 02/26/2019 and stop lisinopril HCTZ-patient reports stopping of the medication and denies starting the new blood pressure medication-does not know the name of this new medication-states the pharmacy did not have the medication

## 2019-02-27 NOTE — NUTRITION
RD provided diet education prior to discharge  Provided GERD Nutrition Therapy Handout  Discussed foods/beverages to avoid such as spearmint, peppermint, chocolate, caffienated beverages, alcohol, pepper and fatty foods  Recommended smaller meals throughout the day instead of 3 large meals  Encouraged physical activity  Discussed participating in outpatient MNT as A1C% was also elevated   Roxana Bhardwaj MPH [facility] LDN

## 2019-02-27 NOTE — ASSESSMENT & PLAN NOTE
Lab Results   Component Value Date    HGBA1C 6 8 (H) 02/27/2019       Recent Labs     02/27/19  0724 02/27/19  1120   POCGLU 104 134       Blood Sugar Average: Last 72 hrs:  (P) 119     Hemoglobin A1c is 6 8  Continue home regimen  Outpatient follow up with PCP

## 2019-02-27 NOTE — UTILIZATION REVIEW
Initial Clinical Review    Admission: Date/Time/Statement:   OBSERVATION WRITTEN 02/27/19 @ 0155 CONVERTED TO INPATIENT ADMISSION 02/27/19 @ 0810 DUE TO FURTHER DIAGNOSTIC WORKUP REQUIRED FOR HYPOMAGNESEMIA WITH NAUSEA AND CHEST PAIN, REQUIRING AT LEAST A 2 MIDNIGHT STAY  Admitting Physician Ellis Le    Level of Care Med Surg    Estimated length of stay More than 2 Midnights    Certification I certify that inpatient services are medically necessary for this patient for a duration of greater than two midnights  See H&P and MD Progress Notes for additional information about the patient's course of treatment  ED: Date/Time/Mode of Arrival:   ED Arrival Information     Expected Arrival Acuity Means of Arrival Escorted By Service Admission Type    - 2/26/2019 23:17 [facility] Emergency    Arrival Complaint    chest pain        Chief Complaint:   Chief Complaint   Patient presents with    Chest Pain     Patient started with left sided chest pain that radiates into left shoulder  Patient stated his whole entire body feels numb and is tingling  History of Illness: Asa Hudson is a 77 y o  male who presented to the emergency room for evaluation of upper abdominal pain and chest pain with radiation to the left shoulder  Patient admits to chest pain some shortness of breath with the chest pain about of nausea, denies vomiting denies lightheadedness denies dizziness denies denies edema, dysuria urgency or frequency, hematochezia or melena  Patient states that he started lisinopril HCTZ approximately 8-9 days ago, reports follows with a doctor at [facility]  States he contacted [facility] today requesting to be taken off that and lisinopril HCTZ due to increased thirst and urinary frequency during the night    Patient states that he was instructed to stop taking her medicine and that a new medicine will be called into his pharmacy-unsure of this medication name  States the pharmacy did not have his medicine and that the prescription was never called in  States he did not take any blood pressure medicines on 02/26/2019  Reports having GERD in the past and does not feel that the symptoms that he was having today are related to the GERD  ED Vital Signs:   ED Triage Vitals [02/26/19 2323]   Temperature Pulse Respirations Blood Pressure SpO2   97 5 °F (36 4 °C) 101 18 (!) 199/103 98 %      Temp Source Heart Rate Source Patient Position - Orthostatic VS BP Location FiO2 (%)   Temporal Monitor Lying Left arm --      Pain Score       8        Wt Readings from Last 1 Encounters:   02/27/19 103 kg (227 lb 4 7 oz)     Vital Signs (abnormal): /84  Pertinent Labs/Diagnostic Test Results: BUN 35, CREAT 1 67, MAG 1 4, TROP <0 02 X3  CXR:  Lung volumes are low but no acute cardiopulmonary disease is seen  CTA DISSECTION PROTOCOL CA:    No evidence of thoracic or abdominal aortic aneurysm or dissection  2   No acute cardiopulmonary process  3   Nonobstructing 1 mm left renal calculus  EKG:  NSR, RBBB  ED Treatment:   Medication Administration from 02/26/2019 2317 to 02/27/2019 0245       Date/Time Order Dose Route Action     02/27/2019 0032 sodium chloride 0 9 % bolus 1,000 mL 1,000 mL Intravenous New Bag     02/27/2019 0028 iohexol (OMNIPAQUE) 350 MG/ML injection (SINGLE-DOSE) 100 mL 100 mL Intravenous Given     02/27/2019 0136 magnesium sulfate 2 g/50 mL IVPB (premix) 2 g 2 g Intravenous New Bag        Past Medical/Surgical History:    Active Ambulatory Problems     Diagnosis Date Noted    Upper respiratory infection, acute 01/25/2018    Duodenitis 09/16/2013    Benign neoplasm of skin 12/11/2014    [address] mellitus [address] [address] ) 06/06/2018    Esophageal reflux 08/06/2012    Benign essential hypertension 07/19/2012    GERD (gastroesophageal reflux disease) 06/06/2018    Hyperlipidemia 06/06/2018    Hypercholesterolemia 08/06/2012    Hypertension 08/06/2012    Microalbuminuria 12/16/2013    Nephrolithiasis 10/15/2015    Panic disorder 03/24/2011    Type 2 diabetes mellitus with hyperglycemia ([facility] ) 09/16/2013    Type II or unspecified type diabetes mellitus without mention of complication, not stated as uncontrolled 07/19/2012     Past Medical History:   Diagnosis Date    Anxiety     Chronic kidney disease     Diabetes mellitus ([facility] )     High cholesterol     Hypertension      Admitting Diagnosis: Hypomagnesemia [E83 42]  Chest pain [R07 9]  Age/Sex: 77 y o  male  Assessment/Plan:   Other chest pain  Assessment & Plan  Patient states had upper abdominal pain that radiated into left chest and up to the left shoulder  Trend troponin with EKGs- 1/3 troponin --negative and EKG-normal sinus rhythm  Admit to med surge with telemetry  Cardiology consult  Cardiac diet-stress diet  Echo ordered  Nuclear med stress test ordered  Benign essential hypertension  Assessment & Plan  Blood pressure currently mildly elevated  Admit to med surge with telemetry  Monitor per protocol  Patient reports that he was to start a new blood pressure medication on 02/26/2019 and stop lisinopril HCTZ-patient reports stopping of the medication and denies starting the new blood pressure medication-does not know the name of this new medication-states the pharmacy did not have the medication    Esophageal reflux  Assessment & Plan  Continue prior to admission medication  Provide supportive care  Diabetes mellitus ([place] [id number] )  Assessment & Plan  Collect new A1c  Continue PTA Januvia  ACCU checks ACHS  ISS  VTE Prophylaxis: Heparin  / sequential compression device   Anticipated Length of Stay:  Patient will be admitted on an Observation basis with an anticipated length of stay of  Less than  2 midnights     Justification for Hospital Stay: chest pain , r/o NSTEMI  Admission Orders:  Telemetry, trop U7L  Cardiac stress test diet  OOB with assist  Serial EKGs  Accuchecks QAC and QHS with coverage  Echo  Stress test  Sequential compression device  Consult cardiology, cm  Scheduled Meds:   Current Facility-Administered Medications:  albuterol 2 5 mg Nebulization Q4H PRN   ALPRAZolam 0 5 mg Oral BID   aspirin 81 mg Oral Daily   atorvastatin 80 mg Oral Daily   heparin (porcine) 5,000 Units Subcutaneous Q8H [address]   insulin lispro 1-6 Units Subcutaneous TID AC   insulin lispro 1-6 Units Subcutaneous HS   nitroglycerin 0 4 mg Sublingual Q5 Min PRN   ondansetron 4 mg Intravenous Once   pantoprazole 40 mg Oral BID AC   sitaGLIPtin 100 mg Oral Daily     Continuous Infusions:    PRN Meds:   albuterol    nitroglycerin    Network Utilization Review Department  Phone: [phone]; Fax [phone]  [email or web address]  ATTENTION: Please call with any questions or concerns to [phone]  and carefully listen to the prompts so that you are directed to the right person  Send all requests for admission clinical reviews, approved or denied determinations and any other requests to fax [phone]   All voicemails are confidential

## 2019-02-27 NOTE — UTILIZATION REVIEW
Notification of Inpatient Admission/Inpatient Authorization Request  This is a Notification of Inpatient Admission/Request for Inpatient Authorization for our facility [address] [facility] [address]  Be advised that this patient was admitted to our facility under Inpatient Status  Please contact the Utilization Review Department where the patient is receiving care services for additional admission information  Place of Service Code: 24   Place of Service Name: Inpatient Hospital  Presentation Date & Time: 2/26/2019 11:21 PM  Inpatient Admission Date & Time: 2/27/19 9066  Discharge Date & Time: No discharge date for patient encounter  Discharge Disposition (if discharged): Home/Self Care  Attending Physician: Juan Dennis [[id number]]  Admission Orders (From admission, onward)    Ordered        02/27/19 0810  Inpatient Admission  Once     Order ID Start Status   [id number] 02/27/19 0810 Completed          02/27/19 0154  Place in Observation  Once     Order ID Start Status   [id number] 02/27/19 0155 Completed                Facility: [address] [place] [address] Utilization Review Department  Phone: [phone]; Fax [phone]  [email or web address]  ATTENTION: Please call with any questions or concerns to [phone]  and carefully listen to the prompts so that you are directed to the right person  Send all requests for admission clinical reviews, approved or denied determinations and any other requests to fax [phone]   All voicemails are confidential

## 2019-02-27 NOTE — ASSESSMENT & PLAN NOTE
Lab Results   Component Value Date    HGBA1C 6 3 12/06/2017       No results for input(s): POCGLU in the last 72 hours      Blood Sugar Average: Last 72 hrs:       Collect new A1c  Continue PTA Januvia  ACCU checks ACHS  ISS

## 2019-02-27 NOTE — ASSESSMENT & PLAN NOTE
Blood pressure elevated on admission  Likely due to pain/anxiety  Blood pressure has since improved  Continue home regimen  Outpatient follow up with PCP

## 2019-02-27 NOTE — RESPIRATORY THERAPY NOTE
RT Protocol Note  Lisa Chisholm 77 y o  male MRN: [id number]  Unit/Bed#: 400-01 Encounter: [id number]    Assessment    Active Problems:    Diabetes mellitus ([facility] )    Esophageal reflux    Benign essential hypertension    Other chest pain      Home Pulmonary Medications:  Patient takes no home respiratory medicines       Past Medical History:   Diagnosis Date    Anxiety     Chronic kidney disease     kidney stones    [facility]     High cholesterol     Hypertension      Social History     Socioeconomic History    Marital status: /Civil Union     Spouse name: None    Number of children: None    Years of education: None    Highest education level: None   Occupational History    None   Social Needs    Financial resource strain: None    Food insecurity:     Worry: None     Inability: None    Transportation needs:     Medical: None     Non-medical: None   Tobacco Use    Smoking status: Never Smoker    Smokeless tobacco: Never Used   Substance and Sexual Activity    Alcohol use: No     Alcohol/week: 0 0 oz     Frequency: Never     Drinks per session: Patient refused     Binge frequency: Never     Comment: n/a    Drug use: No     Comment: n/a    Sexual activity: Not Currently   Lifestyle    Physical activity:     Days per week: None     Minutes per session: None    Stress: None   Relationships    Social connections:     Talks on phone: None     Gets together: None     Attends Yazidi service: None     Active member of club or organization: None     Attends meetings of clubs or organizations: None     Relationship status: None    Intimate partner violence:     Fear of current or ex partner: None     Emotionally abused: None     Physically abused: None     Forced sexual activity: None   Other Topics Concern    None   Social History Narrative    None       Subjective    Subjective Data: Patient denies any shortness of breathe at this time    Objective  Prn bronchodialator and oxygen therapy  Physical Exam:   Assessment Type: Assess only  General Appearance: Alert, Awake  Respiratory Pattern: Normal  Chest Assessment: Chest expansion symmetrical  Bilateral Breath Sounds: Clear  Cough: Non-productive, Dry  O2 Device: room air    Vitals:  Blood pressure (!) 172/84, pulse 82, temperature 98 1 °F (36 7 °C), temperature source Temporal, resp  rate 18, height 5' 8" (1 727 m), weight 103 kg (227 lb 4 7 oz), SpO2 97 %  Imaging and other studies: I have personally reviewed pertinent reports  O2 Device: room air     Plan    Respiratory Plan: No distress/Pulmonary history        Resp Comments: Patient received in his room laying on his right side, no oxygen, not in any respiratory distress   He denies having any oxygen, pap therapy, he denies taking any respiratory medicines,

## 2019-02-27 NOTE — PLAN OF CARE
Problem: PAIN - ADULT  Goal: Verbalizes/displays adequate comfort level or baseline comfort level  Description  Interventions:  - Encourage patient to monitor pain and request assistance  - Assess pain using appropriate pain scale  - Administer analgesics based on type and severity of pain and evaluate response  - Implement non-pharmacological measures as appropriate and evaluate response  - Consider cultural and social influences on pain and pain management  - Notify physician/advanced practitioner if interventions unsuccessful or patient reports new pain  Outcome: Progressing     Problem: INFECTION - ADULT  Goal: Absence or prevention of progression during hospitalization  Description  INTERVENTIONS:  - Assess and monitor for signs and symptoms of infection  - Monitor lab/diagnostic results  - Monitor all insertion sites, i e  indwelling lines, tubes, and drains  - Felt appropriate cooling/warming therapies per order  - Administer medications as ordered  - Instruct and encourage patient and family to use good hand hygiene technique  - Identify and instruct in appropriate isolation precautions for identified infection/condition   Outcome: Progressing     Problem: SAFETY ADULT  Goal: Patient will remain free of falls  Description  INTERVENTIONS:  - Assess patient frequently for physical needs  -  Identify cognitive and physical deficits and behaviors that affect risk of falls    -  Felt fall precautions as indicated by assessment   - Educate patient/family on patient safety including physical limitations  - Instruct patient to call for assistance with activity based on assessment  - Modify environment to reduce risk of injury  - Consider OT/PT consult to assist with strengthening/mobility  Outcome: Progressing  Goal: Maintain or return to baseline ADL function  Description  INTERVENTIONS:  -  Assess patient's ability to carry out ADLs; assess patient's baseline for ADL function and identify physical deficits which impact ability to perform ADLs (bathing, care of mouth/teeth, toileting, grooming, dressing, etc )  - Assess/evaluate cause of self-care deficits   - Assess range of motion  - Assess patient's mobility; develop plan if impaired  - Assess patient's need for assistive devices and provide as appropriate  - Encourage maximum independence but intervene and supervise when necessary  ¯ Involve family in performance of ADLs  ¯ Assess for home care needs following discharge   ¯ Request OT consult to assist with ADL evaluation and planning for discharge  ¯ Provide patient education as appropriate  Outcome: Progressing  Goal: Maintain or return mobility status to optimal level  Description  INTERVENTIONS:  - Assess patient's baseline mobility status (ambulation, transfers, stairs, etc )    - Identify cognitive and physical deficits and behaviors that affect mobility  - Identify mobility aids required to assist with transfers and/or ambulation (gait belt, sit-to-stand, lift, walker, cane, etc )  - [place] fall precautions as indicated by assessment  - Record patient progress and toleration of activity level on Mobility SBAR; progress patient to next Phase/Stage  - Instruct patient to call for assistance with activity based on assessment  - Request Rehabilitation consult to assist with strengthening/weightbearing, etc   Outcome: Progressing     Problem: DISCHARGE PLANNING  Goal: Discharge to home or other facility with appropriate resources  Description  INTERVENTIONS:  - Identify barriers to discharge w/patient and caregiver  - Arrange for needed discharge resources and transportation as appropriate  - Identify discharge learning needs (meds, wound care, etc )  - Arrange for interpretive services to assist at discharge as needed  - Refer to Case Management Department for coordinating discharge planning if the patient needs post-hospital services based on physician/advanced practitioner order or complex needs related to functional status, cognitive ability, or social support system  Outcome: Progressing     Problem: Knowledge Deficit  Goal: Patient/family/caregiver demonstrates understanding of disease process, treatment plan, medications, and discharge instructions  Description  Complete learning assessment and assess knowledge base  Interventions:  - Provide teaching at level of understanding  - Provide teaching via preferred learning methods  Outcome: Progressing     Problem: CARDIOVASCULAR - ADULT  Goal: Maintains optimal cardiac output and hemodynamic stability  Description  INTERVENTIONS:  - Monitor I/O, vital signs and rhythm  - Monitor for S/S and trends of decreased cardiac output i e  bleeding, hypotension  - Administer and titrate ordered vasoactive medications to optimize hemodynamic stability  - Assess quality of pulses, skin color and temperature  - Assess for signs of decreased coronary artery perfusion - ex   Angina  - Instruct patient to report change in severity of symptoms  Outcome: Progressing  Goal: Absence of cardiac dysrhythmias or at baseline rhythm  Description  INTERVENTIONS:  - Continuous cardiac monitoring, monitor vital signs, obtain 12 lead EKG if indicated  - Administer antiarrhythmic and heart rate control medications as ordered  - Monitor electrolytes and administer replacement therapy as ordered  Outcome: Progressing     Problem: GASTROINTESTINAL - ADULT  Goal: Minimal or absence of nausea and/or vomiting  Description  INTERVENTIONS:  - Administer IV fluids as ordered to ensure adequate hydration  - Administer ordered antiemetic medications as needed  - Provide nonpharmacologic comfort measures as appropriate  - Advance diet as tolerated, if ordered  - Nutrition services referral to assist patient with adequate nutrition and appropriate food choices   Outcome: Progressing

## 2019-02-27 NOTE — ASSESSMENT & PLAN NOTE
Magnesium noted to be 1 4 on admission  The patient received 2 g IV magnesium   This am magnesium level is 1 7, patient received an additional 1 g IV magnesium prior to discharge

## 2019-02-27 NOTE — PLAN OF CARE
Problem: PAIN - ADULT  Goal: Verbalizes/displays adequate comfort level or baseline comfort level  Description  Interventions:  - Encourage patient to monitor pain and request assistance  - Assess pain using appropriate pain scale  - Administer analgesics based on type and severity of pain and evaluate response  - Implement non-pharmacological measures as appropriate and evaluate response  - Consider cultural and social influences on pain and pain management  - Notify physician/advanced practitioner if interventions unsuccessful or patient reports new pain  Outcome: Progressing     Problem: INFECTION - ADULT  Goal: Absence or prevention of progression during hospitalization  Description  INTERVENTIONS:  - Assess and monitor for signs and symptoms of infection  - Monitor lab/diagnostic results  - Monitor all insertion sites, i e  indwelling lines, tubes, and drains  - [place] appropriate cooling/warming therapies per order  - Administer medications as ordered  - Instruct and encourage patient and family to use good hand hygiene technique  - Identify and instruct in appropriate isolation precautions for identified infection/condition   Outcome: Progressing     Problem: SAFETY ADULT  Goal: Patient will remain free of falls  Description  INTERVENTIONS:  - Assess patient frequently for physical needs  -  Identify cognitive and physical deficits and behaviors that affect risk of falls    -  [place] fall precautions as indicated by assessment   - Educate patient/family on patient safety including physical limitations  - Instruct patient to call for assistance with activity based on assessment  - Modify environment to reduce risk of injury  - Consider OT/PT consult to assist with strengthening/mobility  Outcome: Progressing  Goal: Maintain or return to baseline ADL function  Description  INTERVENTIONS:  -  Assess patient's ability to carry out ADLs; assess patient's baseline for ADL function and identify physical deficits which impact ability to perform ADLs (bathing, care of mouth/teeth, toileting, grooming, dressing, etc )  - Assess/evaluate cause of self-care deficits   - Assess range of motion  - Assess patient's mobility; develop plan if impaired  - Assess patient's need for assistive devices and provide as appropriate  - Encourage maximum independence but intervene and supervise when necessary  ¯ Involve family in performance of ADLs  ¯ Assess for home care needs following discharge   ¯ Request OT consult to assist with ADL evaluation and planning for discharge  ¯ Provide patient education as appropriate  Outcome: Progressing  Goal: Maintain or return mobility status to optimal level  Description  INTERVENTIONS:  - Assess patient's baseline mobility status (ambulation, transfers, stairs, etc )    - Identify cognitive and physical deficits and behaviors that affect mobility  - Identify mobility aids required to assist with transfers and/or ambulation (gait belt, sit-to-stand, lift, walker, cane, etc )  - [place] fall precautions as indicated by assessment  - Record patient progress and toleration of activity level on Mobility SBAR; progress patient to next Phase/Stage  - Instruct patient to call for assistance with activity based on assessment  - Request Rehabilitation consult to assist with strengthening/weightbearing, etc   Outcome: Progressing     Problem: DISCHARGE PLANNING  Goal: Discharge to home or other facility with appropriate resources  Description  INTERVENTIONS:  - Identify barriers to discharge w/patient and caregiver  - Arrange for needed discharge resources and transportation as appropriate  - Identify discharge learning needs (meds, wound care, etc )  - Arrange for interpretive services to assist at discharge as needed  - Refer to Case Management Department for coordinating discharge planning if the patient needs post-hospital services based on physician/advanced practitioner order or complex needs related to functional status, cognitive ability, or social support system  Outcome: Progressing     Problem: Knowledge Deficit  Goal: Patient/family/caregiver demonstrates understanding of disease process, treatment plan, medications, and discharge instructions  Description  Complete learning assessment and assess knowledge base  Interventions:  - Provide teaching at level of understanding  - Provide teaching via preferred learning methods  Outcome: Progressing     Problem: CARDIOVASCULAR - ADULT  Goal: Maintains optimal cardiac output and hemodynamic stability  Description  INTERVENTIONS:  - Monitor I/O, vital signs and rhythm  - Monitor for S/S and trends of decreased cardiac output i e  bleeding, hypotension  - Administer and titrate ordered vasoactive medications to optimize hemodynamic stability  - Assess quality of pulses, skin color and temperature  - Assess for signs of decreased coronary artery perfusion - ex   Angina  - Instruct patient to report change in severity of symptoms  Outcome: Progressing  Goal: Absence of cardiac dysrhythmias or at baseline rhythm  Description  INTERVENTIONS:  - Continuous cardiac monitoring, monitor vital signs, obtain 12 lead EKG if indicated  - Administer antiarrhythmic and heart rate control medications as ordered  - Monitor electrolytes and administer replacement therapy as ordered  Outcome: Progressing     Problem: GASTROINTESTINAL - ADULT  Goal: Minimal or absence of nausea and/or vomiting  Description  INTERVENTIONS:  - Administer IV fluids as ordered to ensure adequate hydration  - Administer ordered antiemetic medications as needed  - Provide nonpharmacologic comfort measures as appropriate  - Advance diet as tolerated, if ordered  - Nutrition services referral to assist patient with adequate nutrition and appropriate food choices   Outcome: Progressing

## 2019-02-28 LAB
ANION GAP BLD CALC-SCNC: 20 MMOL/L (ref 4–13)
BUN BLD-MCNC: 32 MG/DL (ref 5–25)
CA-I BLD-SCNC: 1.17 MMOL/L (ref 1.12–1.32)
CHLORIDE BLD-SCNC: 104 MMOL/L (ref 100–108)
CREAT BLD-MCNC: 1.5 MG/DL (ref 0.6–1.3)
GFR SERPL CREATININE-BSD FRML MDRD: 46 ML/MIN/1.73SQ M
GLUCOSE SERPL-MCNC: 147 MG/DL (ref 65–140)
HCT VFR BLD CALC: 33 % (ref 36.5–49.3)
HGB BLDA-MCNC: 11.2 G/DL (ref 12–17)
PCO2 BLD: 22 MMOL/L (ref 21–32)
POTASSIUM BLD-SCNC: 3.8 MMOL/L (ref 3.5–5.3)
SODIUM BLD-SCNC: 141 MMOL/L (ref 136–145)
SPECIMEN SOURCE: ABNORMAL

## 2019-02-28 NOTE — UTILIZATION REVIEW
Notification of Discharge  This is a Notification of Discharge from our facility [address]  Please be advised that this patient has been discharge from our facility  Below you will find the admission and discharge date and time including the patients disposition  PRESENTATION DATE: 2/26/2019 11:21 PM  IP ADMISSION DATE: 2/27/19 0810  DISCHARGE DATE: 2/27/2019  3:50 PM  DISPOSITION: [address] [facility] Utilization Review Department  Phone: [phone]; Fax [phone]  [email or web address]  ATTENTION: Please call with any questions or concerns to [phone]  and carefully listen to the prompts so that you are directed to the right person  Send all requests for admission clinical reviews, approved or denied determinations and any other requests to fax [phone]   All voicemails are confidential

## 2019-03-10 DIAGNOSIS — J01.10 ACUTE NON-RECURRENT FRONTAL SINUSITIS: ICD-10-CM

## 2019-03-11 RX ORDER — FLUTICASONE PROPIONATE 50 MCG
SPRAY, SUSPENSION (ML) NASAL
Qty: 1 BOTTLE | Refills: 3 | Status: SHIPPED | OUTPATIENT
Start: 2019-03-11 | End: 2019-08-12

## 2019-03-14 DIAGNOSIS — E78.5 HYPERLIPIDEMIA, UNSPECIFIED HYPERLIPIDEMIA TYPE: ICD-10-CM

## 2019-03-14 RX ORDER — ATORVASTATIN CALCIUM 80 MG/1
TABLET, FILM COATED ORAL
Qty: 90 TABLET | Refills: 2 | Status: SHIPPED | OUTPATIENT
Start: 2019-03-14

## 2019-08-12 ENCOUNTER — OFFICE VISIT (OUTPATIENT)
Dept: FAMILY MEDICINE CLINIC | Facility: HOME HEALTHCARE | Age: 69
End: 2019-08-12
Payer: COMMERCIAL

## 2019-08-12 VITALS
TEMPERATURE: 97.7 F | BODY MASS INDEX: 34.71 KG/M2 | WEIGHT: 229 LBS | HEIGHT: 68 IN | RESPIRATION RATE: 16 BRPM | DIASTOLIC BLOOD PRESSURE: 70 MMHG | HEART RATE: 89 BPM | SYSTOLIC BLOOD PRESSURE: 132 MMHG | OXYGEN SATURATION: 98 %

## 2019-08-12 DIAGNOSIS — J30.2 SEASONAL ALLERGIES: ICD-10-CM

## 2019-08-12 DIAGNOSIS — Z23 NEED FOR PNEUMOCOCCAL VACCINATION: Primary | ICD-10-CM

## 2019-08-12 DIAGNOSIS — E11.65 TYPE 2 DIABETES MELLITUS WITH HYPERGLYCEMIA, WITHOUT LONG-TERM CURRENT USE OF INSULIN (HCC): ICD-10-CM

## 2019-08-12 LAB — SL AMB POCT HEMOGLOBIN AIC: 6.5 (ref ?–6.5)

## 2019-08-12 PROCEDURE — 90471 IMMUNIZATION ADMIN: CPT | Performed by: FAMILY MEDICINE

## 2019-08-12 PROCEDURE — 99213 OFFICE O/P EST LOW 20 MIN: CPT | Performed by: FAMILY MEDICINE

## 2019-08-12 PROCEDURE — 90670 PCV13 VACCINE IM: CPT

## 2019-08-12 RX ORDER — MONTELUKAST SODIUM 10 MG/1
10 TABLET ORAL
Qty: 30 TABLET | Refills: 0 | Status: SHIPPED | OUTPATIENT
Start: 2019-08-12 | End: 2019-09-09 | Stop reason: SDUPTHER

## 2019-08-12 NOTE — PROGRESS NOTES
OFFICE VISIT  Francisco J Mccoy 77 y o  male MRN: [id number]      Assessment / Plan:  Diagnoses and all orders for this visit:    Need for pneumococcal vaccination  -     PNEUMOCOCCAL CONJUGATE VACCINE 13-VALENT GREATER THAN 6 MONTHS    Type 2 diabetes mellitus with hyperglycemia, without long-term current use of insulin (HCC)  -     POCT hemoglobin A1c    Seasonal allergies  -     montelukast (SINGULAIR) 10 mg tablet; Take 1 tablet (10 mg total) by mouth daily at bedtime    Other orders  -     Cancel: Hepatitis C antibody; Future          Reason For Visit / Chief Complaint  Chief Complaint   Patient presents with    Follow-up        HPI:  Francisco J Mccoy is a 77 y o  male who presents today for routine follow up  Pt has know diabetes, aic 6 2, on metformin  He reports runny nose, cough, worse when cutting grass   No other complaints    Historical Information   Past Medical History:   Diagnosis Date    Anxiety     Chronic kidney disease     kidney stones    Diabetes mellitus (HCC)     GERD (gastroesophageal reflux disease)     High cholesterol     Hypertension      Past Surgical History:   Procedure Laterality Date    APPENDECTOMY      CHOLECYSTECTOMY      WY CYSTO/URETERO W/LITHOTRIPSY &INDWELL STENT INSRT Right 2/3/2016    Procedure: CYSTOSCOPY; URETEROSCOPY; HOLMIUM LASER LITHOTRIPSY; BASKET STONE EXTRACTION; RETROGRADE PYELOGRAM; STENT PLACEMENT ;  Surgeon: Vidhi Ortega MD;  Location: [facility];  Service: Urology    TONSILLECTOMY       Social History   Social History     Substance and Sexual Activity   Alcohol Use Never    Frequency: Never    Drinks per session: Patient refused    Binge frequency: Never     Social History     Substance and Sexual Activity   Drug Use No    Comment: n/a     Social History     Tobacco Use   Smoking Status Former Smoker    Packs/day: 0 00    Years: 15 00    Pack years: 0 00    Last attempt to quit: Betty Fisher Years since quittin 6   Smokeless Tobacco Never Used Family History   Problem Relation Age of Onset   Learta January Cancer Father     Lung cancer Father     Kidney disease Mother     Diabetes Mother     No Known Problems Sister     No Known Problems Son     No Known Problems Daughter        Meds/Allergies   No Known Allergies    Meds:    Current Outpatient Medications:     ACCU-CHEK SOFTCLIX LANCETS lancets, by Does not apply route, Disp: , Rfl:     ALPRAZolam (XANAX) 0 5 mg tablet, Take 0 5 mg by mouth 2 (two) times a day , Disp: , Rfl:     amLODIPine (NORVASC) 5 mg tablet, Take 5 mg by mouth daily, Disp: , Rfl:     aspirin 81 MG tablet, Take 81 mg by mouth daily  , Disp: , Rfl:     atorvastatin (LIPITOR) 80 mg tablet, TAKE 1 TABLET DAILY, Disp: 90 tablet, Rfl: 2    enalapril (VASOTEC) 20 mg tablet, Take 1 tablet (20 mg total) by mouth daily, Disp: 90 tablet, Rfl: 2    glimepiride (AMARYL) 2 mg tablet, Take 4 mg by mouth every evening , Disp: , Rfl:     glucose blood test strip, Once daily - non insulin treated medication regulation  e11 9, Disp: , Rfl:     metFORMIN (GLUCOPHAGE) 500 mg tablet, Take 500 mg by mouth Indications: 1 tab in am and 2 tabs in evening , Disp: , Rfl:     Multiple Vitamin (MULTIVITAMIN) tablet, Take 1 tablet by mouth daily  , Disp: , Rfl:     omeprazole (PriLOSEC) 20 mg delayed release capsule, Take 20 mg by mouth 2 (two) times a day, Disp: , Rfl: 3    ranitidine (ZANTAC) 150 MG capsule, Take 150 mg by mouth 2 (two) times a day, Disp: , Rfl:     sitaGLIPtin (JANUVIA) 100 mg tablet, Take 100 mg by mouth daily  , Disp: , Rfl:     montelukast (SINGULAIR) 10 mg tablet, Take 1 tablet (10 mg total) by mouth daily at bedtime, Disp: 30 tablet, Rfl: 0      REVIEW OF SYSTEMS  Review of Systems   Constitutional: Negative for appetite change, fatigue and fever  HENT: Positive for postnasal drip  Negative for congestion, ear discharge and ear pain  Eyes: Negative for pain, discharge, redness, itching and visual disturbance     Respiratory: Positive for cough  Negative for chest tightness, shortness of breath and wheezing  Cardiovascular: Negative for chest pain, palpitations and leg swelling  Gastrointestinal: Negative for abdominal distention, abdominal pain, blood in stool, diarrhea, nausea and vomiting  Endocrine: Negative for cold intolerance, heat intolerance, polydipsia, polyphagia and polyuria  Genitourinary: Negative for decreased urine volume, difficulty urinating, dysuria, frequency, hematuria, testicular pain and urgency  Musculoskeletal: Negative for arthralgias, back pain, myalgias, neck pain and neck stiffness  Skin: Negative for color change, pallor, rash and wound  Neurological: Negative for dizziness, light-headedness, numbness and headaches  Hematological: Negative for adenopathy  Does not bruise/bleed easily  Psychiatric/Behavioral: Negative for agitation, behavioral problems, self-injury, sleep disturbance and suicidal ideas  The patient is not nervous/anxious  Current Vitals:   Blood Pressure: 132/70 (08/12/19 1019)  Pulse: 89 (08/12/19 1019)  Temperature: 97 7 °F (36 5 °C) (08/12/19 1019)  Temp Source: Tympanic (08/12/19 1019)  Respirations: 16 (08/12/19 1019)  Height: 5' 8" (172 7 cm) (08/12/19 1019)  Weight - Scale: 104 kg (229 lb) (08/12/19 1019)  SpO2: 98 % (08/12/19 1019)  [unfilled]    PHYSICAL EXAMS:  Physical Exam   Constitutional: He is oriented to person, place, and time  He appears well-developed and well-nourished  HENT:   Head: Normocephalic and atraumatic  Right Ear: External ear normal    Left Ear: External ear normal    Nose: Nose normal    Mouth/Throat: Oropharynx is clear and moist    Eyes: Pupils are equal, round, and reactive to light  Conjunctivae are normal  Right eye exhibits no discharge  Left eye exhibits no discharge  Neck: Normal range of motion  Neck supple  No thyromegaly present  Cardiovascular: Normal rate, regular rhythm and normal heart sounds  Pulmonary/Chest: Effort normal and breath sounds normal    Abdominal: Soft  Bowel sounds are normal  He exhibits no distension  There is no tenderness  Musculoskeletal: Normal range of motion  He exhibits no edema, tenderness or deformity  Neurological: He is alert and oriented to person, place, and time  Skin: Skin is warm and dry  No rash noted  No erythema  Psychiatric: He has a normal mood and affect  His behavior is normal            Follow up at this office in 6 months     Counseling / Coordination of Care  Total floor / unit time spent today 20 minutes  Greater than 50% of total time was spent with the patient and / or family counseling and / or coordination of care

## 2019-09-09 DIAGNOSIS — J30.2 SEASONAL ALLERGIES: ICD-10-CM

## 2019-09-09 RX ORDER — MONTELUKAST SODIUM 10 MG/1
10 TABLET ORAL
Qty: 90 TABLET | Refills: 0 | Status: SHIPPED | OUTPATIENT
Start: 2019-09-09

## 2019-10-09 ENCOUNTER — TRANSCRIBE ORDERS (OUTPATIENT)
Dept: ADMINISTRATIVE | Facility: HOSPITAL | Age: 69
End: 2019-10-09

## 2019-10-09 DIAGNOSIS — I70.203 BILATERAL FEMORAL ARTERY STENOSIS (HCC): Primary | ICD-10-CM

## 2019-10-30 ENCOUNTER — HOSPITAL ENCOUNTER (OUTPATIENT)
Dept: NON INVASIVE DIAGNOSTICS | Facility: HOSPITAL | Age: 69
Discharge: HOME | End: 2019-10-30
Attending: PODIATRIST
Payer: COMMERCIAL

## 2019-10-30 DIAGNOSIS — I70.203 BILATERAL FEMORAL ARTERY STENOSIS (HCC): ICD-10-CM

## 2019-10-30 PROCEDURE — 93922 UPR/L XTREMITY ART 2 LEVELS: CPT | Performed by: SURGERY

## 2019-10-30 PROCEDURE — 93923 UPR/LXTR ART STDY 3+ LVLS: CPT

## 2019-10-30 PROCEDURE — 93925 LOWER EXTREMITY STUDY: CPT | Performed by: SURGERY

## 2019-10-30 PROCEDURE — 93925 LOWER EXTREMITY STUDY: CPT

## 2020-08-31 ENCOUNTER — TRANSCRIBE ORDERS (OUTPATIENT)
Dept: PHYSICAL THERAPY | Facility: CLINIC | Age: 70
End: 2020-08-31

## 2020-08-31 ENCOUNTER — EVALUATION (OUTPATIENT)
Dept: PHYSICAL THERAPY | Facility: CLINIC | Age: 70
End: 2020-08-31
Payer: COMMERCIAL

## 2020-08-31 DIAGNOSIS — R42 DIZZINESS: Primary | ICD-10-CM

## 2020-08-31 DIAGNOSIS — R42 DIZZINESS AND GIDDINESS: Primary | ICD-10-CM

## 2020-08-31 PROCEDURE — 97161 PT EVAL LOW COMPLEX 20 MIN: CPT | Performed by: PHYSICAL THERAPIST

## 2020-08-31 PROCEDURE — 95992 CANALITH REPOSITIONING PROC: CPT | Performed by: PHYSICAL THERAPIST

## 2020-08-31 NOTE — PROGRESS NOTES
PT Evaluation  and PT Discharge    Today's date: 2020  Patient name: Milagros Marti  :   MRN: [id number]  Referring provider: Sreedhar Cadena DO  Dx:   Encounter Diagnosis     ICD-10-CM    1  Dizziness  R42 Ambulatory referral to Physical Therapy                  Assessment  Assessment details: Milagros Marti is a 65y o  year old male presenting to PT with dizziness with turning or rolling left and with cervical extension  Signs and symptoms are consistent with referring diagnosis of BPPV, impacting L anterior canal   Sangeetha Hendricks would benefit from skilled PT services to address these issues and to maximize function  Home exercise provided and all questions answered  Thank you for the referral     Impairments: activity intolerance  Other impairment: dizziness  Understanding of Dx/Px/POC: good   Prognosis: good    Goals  STG: to be achieved in 4 weeks   1) Independent in basic Home Exercise Program for balance   2) Pt reporting decrease in vertiginous symptoms > 50%     LTG: to be achieved in 8 weeks   1) Pt demonstrating low fall risk on balance assessment    2) Pt reporting alleviation of vertiginous symptoms   3) Pt to be free from s/s of BPPV in bilateral canals   4) Pt free from falls      Plan  Patient would benefit from: skilled physical therapy  Planned therapy interventions: neuromuscular re-education and canalith repositioning  Frequency: 1x week  Duration in weeks: 4        Subjective Evaluation    History of Present Illness  Mechanism of injury: Sangeetha Hendrciks reports 6 mo history of "ear problems"  And previous history of vertigo - with a severe case  "once every 8 years"  In the past he has been in the ED or hospitalized to r/o cardiac pathology or CVA  Most recently his vertigo occurred in   This was addressed at home with liberatory movement and treatment for horizontal canal treatments without full resolution of symptoms        He has been cleared of all other eye, inner ear and sinus pathology, despite reporting eye pressure in his eyes and ears    Quality of life: good    Pain  No pain reported    Treatments  Current treatment: physical therapy  Patient Goals  Patient goals for therapy: improved balance  Patient goal: Eliminate vertigo        Objective  Vestibular Evaluation  Vitals:  BP, HR, SpO2  Supine (5 min):  Supine to Sit:  Sit to Stand (within 2 mins of standing):    Comments:    Concurrent Complaints:  ____ Hearing Loss (Right/Left/Bilateral)  _x___ Aural Fullness (Right/Left/Bilateral)  ____ Tinnitus (Right/Left/Bilateral)  __x__ Nausea, Vomiting  ____ Altered Vision  ____ Poor Concentration  ____ Peripheral Neuropathy  __x__ Headache  __x__ Falls or Near Falls  ____     Memory Loss  __x__ Cervical Pain    Symptom Rating Visual Analog Scales  Vertigo  7/10  Dysequilibrium  3/10        Oculomotor Function  Spontaneous Nystagmus Room Light: Negative  Gaze Holding Nystagmus Room Light:  Negative      Smooth Pursuit/VOR:  Saccadic      Vestibular Ocular Reflex Assessment    VOR Cancellation:  Abnormal  Saccades Room Light:  Abnormal    Positional Testing:  Vertebral Artery Screen:  Negative  Hallpike-Ames Left:  +    Hallpike-Ames Right:  -      Flowsheet Rows      Most Recent Value   PT/OT G-Codes   Current Score  84   Projected Score  82             Precautions: na      Manuals 8-31                                                                Neuro Re-Ed             Education of inner ear anatomy and pathology of vertigo 15'                         Epley Lx2                                                                Ther Ex                                                                                                                     Ther Activity                                       Gait Training                                       Modalities

## 2020-08-31 NOTE — LETTER
2020    Emery Baron DO  [address]  [address] [facility]    Patient: Asa Hudson   YOB: 1950   Date of Visit: 2020     Encounter Diagnosis     ICD-10-CM    1  Dizziness  R42 Ambulatory referral to Physical Therapy       Dear Dr Beverly Haro: Thank you for your recent referral of Asa Hudson  Please review the attached evaluation summary from Jay Jay's recent visit  Please verify that you agree with the plan of care by signing the attached order  If you have any questions or concerns, please do not hesitate to call  I sincerely appreciate the opportunity to share in the care of one of your patients and hope to have another opportunity to work with you in the near future  Sincerely,    Marlene Hoffmann, PT      Referring Provider:      I certify that I have read the below Plan of Care and certify the need for these services furnished under this plan of treatment while under my care  Atokamarvin Baron DO  [address]  [address]  [facility] [address]  [employer] [employer]          PT Evaluation     Today's date: 2020  Patient name: Asa Hudson  :   MRN: [id number]  Referring provider: Tianna Beard DO  Dx:   Encounter Diagnosis     ICD-10-CM    1  Dizziness  R42 Ambulatory referral to Physical Therapy                  Assessment  Assessment details: Asa Hudson is a 65y o  year old male presenting to PT with dizziness with turning or rolling left and with cervical extension  Signs and symptoms are consistent with referring diagnosis of BPPV, impacting L anterior canal   Leonel Gabriel would benefit from skilled PT services to address these issues and to maximize function  Home exercise provided and all questions answered   Thank you for the referral     Impairments: activity intolerance  Other impairment: dizziness  Understanding of Dx/Px/POC: good   Prognosis: good    Goals  STG: to be achieved in 4 weeks   1) Independent in basic Home Exercise Program for balance   2) Pt reporting decrease in vertiginous symptoms > 50%     LTG: to be achieved in 8 weeks   1) Pt demonstrating low fall risk on balance assessment    2) Pt reporting alleviation of vertiginous symptoms   3) Pt to be free from s/s of BPPV in bilateral canals   4) Pt free from falls      Plan  Patient would benefit from: skilled physical therapy  Planned therapy interventions: neuromuscular re-education and canalith repositioning  Frequency: 1x week  Duration in weeks: 4        Subjective Evaluation    History of Present Illness  Mechanism of injury: Dana Calero reports 6 mo history of "ear problems"  And previous history of vertigo - with a severe case  "once every 8 years"  In the past he has been in the ED or hospitalized to r/o cardiac pathology or CVA  Most recently his vertigo occurred in June  This was addressed at home with liberatory movement and treatment for horizontal canal treatments without full resolution of symptoms  He has been cleared of all other eye, inner ear and sinus pathology, despite reporting eye pressure in his eyes and ears    Quality of life: good    Pain  No pain reported    Treatments  Current treatment: physical therapy  Patient Goals  Patient goals for therapy: improved balance  Patient goal: Eliminate vertigo        Objective  Vestibular Evaluation  Vitals:  BP, HR, SpO2  Supine (5 min):  Supine to Sit:  Sit to Stand (within 2 mins of standing):    Comments:    Concurrent Complaints:  ____ Hearing Loss (Right/Left/Bilateral)  _x___ Aural Fullness (Right/Left/Bilateral)  ____ Tinnitus (Right/Left/Bilateral)  __x__ Nausea, Vomiting  ____ Altered Vision  ____ Poor Concentration  ____ Peripheral Neuropathy  __x__ Headache  __x__ Falls or Near Falls  ____     Memory Loss  __x__ Cervical Pain    Symptom Rating Visual Analog Scales  Vertigo  7/10  Dysequilibrium  3/10        Oculomotor Function  Spontaneous Nystagmus Room Light: Negative  Gaze Holding Nystagmus Room Light:  Negative      Smooth Pursuit/VOR:  Saccadic      Vestibular Ocular Reflex Assessment    VOR Cancellation:  Abnormal  Saccades Room Light:  Abnormal    Positional Testing:  Vertebral Artery Screen:  Negative  Hallpike-Clements Left:  +    Hallpike-Clements Right:  -      Flowsheet Rows      Most Recent Value   PT/OT G-Codes   Current Score  84   Projected Score  82             Precautions: na      Manuals 8-31                                                                Neuro Re-Ed             Education of inner ear anatomy and pathology of vertigo 15'                         Epley Lx2                                                                Ther Ex                                                                                                                     Ther Activity                                       Gait Training                                       Modalities

## 2020-11-18 ENCOUNTER — OFFICE VISIT (OUTPATIENT)
Dept: URGENT CARE | Facility: CLINIC | Age: 70
End: 2020-11-18
Payer: COMMERCIAL

## 2020-11-18 VITALS — RESPIRATION RATE: 16 BRPM | HEART RATE: 93 BPM | OXYGEN SATURATION: 95 % | TEMPERATURE: 97.5 F

## 2020-11-18 DIAGNOSIS — Z20.822 EXPOSURE TO COVID-19 VIRUS: Primary | ICD-10-CM

## 2020-11-18 PROCEDURE — 99212 OFFICE O/P EST SF 10 MIN: CPT | Performed by: PHYSICIAN ASSISTANT

## 2020-11-18 PROCEDURE — 87637 SARSCOV2&INF A&B&RSV AMP PRB: CPT | Performed by: PHYSICIAN ASSISTANT

## 2020-11-21 LAB
FLUAV RNA NPH QL NAA+PROBE: NOT DETECTED
FLUBV RNA NPH QL NAA+PROBE: NOT DETECTED
RSV RNA NPH QL NAA+PROBE: NOT DETECTED
SARS-COV-2 RNA NPH QL NAA+PROBE: NOT DETECTED

## 2021-03-09 DIAGNOSIS — Z23 ENCOUNTER FOR IMMUNIZATION: ICD-10-CM

## 2023-01-31 NOTE — SOCIAL WORK
Pt is being dc'd home on this date with no CM needs  Pt will be calling to schedule a follow up appointment with his PCP-Janessa Sheehan  PAST MEDICAL HISTORY:  Kidney stones     Urinary tract infection, E. coli

## 2023-04-27 ENCOUNTER — EVALUATION (OUTPATIENT)
Dept: PHYSICAL THERAPY | Facility: CLINIC | Age: 73
End: 2023-04-27

## 2023-04-27 DIAGNOSIS — M72.2 PLANTAR FASCIITIS, BILATERAL: Primary | ICD-10-CM

## 2023-04-27 DIAGNOSIS — M76.62 TENDONITIS, ACHILLES, LEFT: ICD-10-CM

## 2023-04-27 NOTE — LETTER
2023    Concepcionsamantha Iain Vickers  [address] [facility] [address]    Patient: Breezy Colon   YOB: 1950   Date of Visit: 2023     Encounter Diagnosis     ICD-10-CM    1  Plantar fasciitis, bilateral  M72 2       2  Tendonitis, Achilles, left  M76 62           Dear Dr Byron Perry:    Thank you for your recent referral of Breezy Colon  Please review the attached evaluation summary from Jay Jay's recent visit  Please verify that you agree with the plan of care by signing the attached order  If you have any questions or concerns, please do not hesitate to call  I sincerely appreciate the opportunity to share in the care of one of your patients and hope to have another opportunity to work with you in the near future  Sincerely,    Kevin Andrews, PT      Referring Provider:      I certify that I have read the below Plan of Care and certify the need for these services furnished under this plan of treatment while under my care  Iain Byers  [address] [facility] [address]  Via Fax: [phone]          PT Evaluation     Today's date: 2023  Patient name: Breezy Colon  :   MRN: [id number]  Referring provider: Adela Hammonds, *  Dx:   Encounter Diagnosis     ICD-10-CM    1  Plantar fasciitis, bilateral  M72 2       2  Tendonitis, Achilles, left  M76 62                      Assessment  Assessment details: Breezy Colon is a 74 y o  male who presents to physical therapy with diagnosis of left Achilles Tendonitis and bilateral Plantar Fascitis  Kenyatta Meyer presents with pain, abnormal gait and balance, and limitations in range of motion, strength, joint mobility, flexibility, and functional ability  The current limitations are affecting Jay Jay's ability to function at prior level   He will benefit from skilled physical therapy to address the current impairments and functional limitations to enable him to return to daily activities at maximal level  Thank you for the referral     Impairments: abnormal gait, abnormal or restricted ROM, activity intolerance, impaired balance, impaired physical strength, lacks appropriate home exercise program, pain with function, weight-bearing intolerance and poor posture     Symptom irritability: lowUnderstanding of Dx/Px/POC: good   Prognosis: good    Goals  STG  Patient will decrease pain at worst to 5/10  Patient will demonstrate bilateral ankle dorsiflexion of at least 5 deg  Patient will be independent with basic HEP    LTG  Patient will decrease pain at worst to 1-2/10  Patient will improve LE strength 1/2 grade in all deficit planes  Patient will report ability to complete household chores/yardwork without foot pain  Patient will improve SLS balance to 8 seconds bilaterally    Plan  Patient would benefit from: skilled physical therapy  Planned modality interventions: cryotherapy and thermotherapy: hydrocollator packs  Planned therapy interventions: manual therapy, neuromuscular re-education, patient education, therapeutic activities, therapeutic exercise, therapeutic training and home exercise program  Frequency: 2x week  Duration in weeks: 8  Treatment plan discussed with: patient        Subjective Evaluation    History of Present Illness  Mechanism of injury: Luisito Ruiz is a 74 y o  male presenting to physical therapy on 04/27/23 with primary complaints of bilateral foot pain  He mentions he has been dealing with pain on the bottom of his feet for over a year  He has seen multiple doctors and no one seems to be able to help his pain  He also reports about 2 weeks ago he started having a lot of pain in his left Achilles which has been limiting him even more now  He was given a metal boot from the doctor which he wears most of the time, but does not have it on today  He is able to complete his ADLs and household chores but has discomfort and it takes him longer to complete the tasks   He is able to ascend/descend the steps in a reciprocal pattern most of the time  He can be on his feet for about 30 minutes before it starts to really bother him  He has the most pain and tightness in the mornings  He has a history of L TKA in 2022 and notes that he needs to get the R one done this year too  His next follow up is on   Pain  Current pain ratin  At best pain ratin  At worst pain ratin  Location: bilateral feet  Quality: dull ache and tight  Relieving factors: ice and rest  Aggravating factors: walking, standing and stair climbing    Social Support  Steps to enter house: yes (2)  Stairs in house: yes (13)   Lives with: spouse    Treatments  No previous or current treatments  Patient Goals  Patient goals for therapy: decreased pain, independence with ADLs/IADLs, increased strength, return to sport/leisure activities, increased motion and improved balance          Objective    Palpation: tenderness noted along bilateral plantar fascia R>L, mild tenderness along achilles tendon insertion L    L Ankle AROM:  Dorsiflexion: 0 degrees  Plantarflexion: 25 degrees  Inversion: 12 degrees  Eversion: 15 degrees    R Ankle AROM:  Dorsiflexion: 4 degrees  Plantarflexion: 35 degrees  Inversion: 10 degrees  Eversion: 12 degrees    Ankle Strength (L,R)  Dorsiflexion: 5/5 , 5/5  Inversion: 4/5 , 4/5  Eversion: 4/5 , 4/5    Heel Raises: DL: decreased heel height on left    Joint Mobility: WFL    Flexibility: tightness in bilateral hamstrings      Balance:  FTEC: 5 seconds  Tandem R foot fwd: 10 seconds L foot fwd: 20 seconds  SLS R: 2 seconds   SLS L: 1 second    Gait: decreased heel strike and push off LLE       Precautions: Anxiety, Chronic Kidney Disease, Diabetes, High Cholesterol, Hypertension, GERD, hx L TKA 2022    Manuals        Ankle PROM gil        IASTM plantar fascia gil        gastroc / HS stretching                Neuro Re-Ed        Tandem Balance        SLS Balance "       FTEO Foam        FTEC Firm                                Ther Ex        Bike / Nustep (ROM)        Towel Plantar Fascia Stretch 30\"x3 B       Seated HS Stretch 30\"x3 B       Seated HR; slow eccentric x20       Towel Scrunches 3\"x20       Ankle TB 4 way                        Ther Activity        Step Up Fwd        Eccentric Step Down                Gait Training                        Modalities                                        "

## 2023-04-27 NOTE — PROGRESS NOTES
PT Evaluation     Today's date: 2023  Patient name: Marylen Dutton  : [birth year]  MRN: [id number]  Referring provider: Gerda Cummins, *  Dx:   Encounter Diagnosis     ICD-10-CM    1  Plantar fasciitis, bilateral  M72 2       2  Tendonitis, Achilles, left  M76 62                      Assessment  Assessment details: Marylen Dutton is a 74 y o  male who presents to physical therapy with diagnosis of left Achilles Tendonitis and bilateral Plantar Fascitis  Winter Delay presents with pain, abnormal gait and balance, and limitations in range of motion, strength, joint mobility, flexibility, and functional ability  The current limitations are affecting Jay Jay's ability to function at prior level  He will benefit from skilled physical therapy to address the current impairments and functional limitations to enable him to return to daily activities at maximal level  Thank you for the referral     Impairments: abnormal gait, abnormal or restricted ROM, activity intolerance, impaired balance, impaired physical strength, lacks appropriate home exercise program, pain with function, weight-bearing intolerance and poor posture     Symptom irritability: lowUnderstanding of Dx/Px/POC: good   Prognosis: good    Goals  STG  Patient will decrease pain at worst to 5/10  Patient will demonstrate bilateral ankle dorsiflexion of at least 5 deg    Patient will be independent with basic HEP    LTG  Patient will decrease pain at worst to 1-2/10  Patient will improve LE strength 1/2 grade in all deficit planes  Patient will report ability to complete household chores/yardwork without foot pain  Patient will improve SLS balance to 8 seconds bilaterally    Plan  Patient would benefit from: skilled physical therapy  Planned modality interventions: cryotherapy and thermotherapy: hydrocollator packs  Planned therapy interventions: manual therapy, neuromuscular re-education, patient education, therapeutic activities, therapeutic exercise, therapeutic training and home exercise program  Frequency: 2x week  Duration in weeks: 8  Treatment plan discussed with: patient        Subjective Evaluation    History of Present Illness  Mechanism of injury: Ashley Hilliard is a 74 y o  male presenting to physical therapy on 23 with primary complaints of bilateral foot pain  He mentions he has been dealing with pain on the bottom of his feet for over a year  He has seen multiple doctors and no one seems to be able to help his pain  He also reports about 2 weeks ago he started having a lot of pain in his left Achilles which has been limiting him even more now  He was given a metal boot from the doctor which he wears most of the time, but does not have it on today  He is able to complete his ADLs and household chores but has discomfort and it takes him longer to complete the tasks  He is able to ascend/descend the steps in a reciprocal pattern most of the time  He can be on his feet for about 30 minutes before it starts to really bother him  He has the most pain and tightness in the mornings  He has a history of L TKA in 2022 and notes that he needs to get the R one done this year too  His next follow up is on      Pain  Current pain ratin  At best pain ratin  At worst pain ratin  Location: bilateral feet  Quality: dull ache and tight  Relieving factors: ice and rest  Aggravating factors: walking, standing and stair climbing    Social Support  Steps to enter house: yes (2)  Stairs in house: yes (13)   Lives with: spouse    Treatments  No previous or current treatments  Patient Goals  Patient goals for therapy: decreased pain, independence with ADLs/IADLs, increased strength, return to sport/leisure activities, increased motion and improved balance          Objective    Palpation: tenderness noted along bilateral plantar fascia R>L, mild tenderness along achilles tendon insertion L    L Ankle AROM:  Dorsiflexion: 0 "degrees  Plantarflexion: 25 degrees  Inversion: 12 degrees  Eversion: 15 degrees    R Ankle AROM:  Dorsiflexion: 4 degrees  Plantarflexion: 35 degrees  Inversion: 10 degrees  Eversion: 12 degrees    Ankle Strength (L,R)  Dorsiflexion: 5/5 , 5/5  Inversion: 4/5 , 4/5  Eversion: 4/5 , 4/5    Heel Raises: DL: decreased heel height on left    Joint Mobility: WFL    Flexibility: tightness in bilateral hamstrings      Balance:  FTEC: 5 seconds  Tandem R foot fwd: 10 seconds L foot fwd: 20 seconds  SLS R: 2 seconds   SLS L: 1 second    Gait: decreased heel strike and push off LLE        Precautions: Anxiety, Chronic Kidney Disease, Diabetes, High Cholesterol, Hypertension, GERD, hx L TKA 12/2022    Manuals 4/27       Ankle PROM gil        IASTM plantar fascia gil        gastroc / HS stretching                Neuro Re-Ed        Tandem Balance        SLS Balance        FTEO Foam        FTEC Firm                                Ther Ex        Bike / Nustep (ROM)        Towel Plantar Fascia Stretch 30\"x3 B       Seated HS Stretch 30\"x3 B       Seated HR; slow eccentric x20       Towel Scrunches 3\"x20       Ankle TB 4 way                        Ther Activity        Step Up Fwd        Eccentric Step Down                Gait Training                        Modalities                           "

## 2023-05-01 ENCOUNTER — OFFICE VISIT (OUTPATIENT)
Dept: PHYSICAL THERAPY | Facility: CLINIC | Age: 73
End: 2023-05-01

## 2023-05-01 DIAGNOSIS — M72.2 PLANTAR FASCIITIS, BILATERAL: Primary | ICD-10-CM

## 2023-05-01 DIAGNOSIS — M76.62 TENDONITIS, ACHILLES, LEFT: ICD-10-CM

## 2023-05-01 NOTE — PROGRESS NOTES
"Daily Note     Today's date: 2023  Patient name: Holly Rubi  : 3/12/1899  MRN: [id number]  Referring provider: Cornelio Gómez, *  Dx:   Encounter Diagnosis     ICD-10-CM    1  Plantar fasciitis, bilateral  M72 2       2  Tendonitis, Achilles, left  M76 62                      Subjective: Pt reports that the stretches went well at home  Objective: See treatment diary below      Assessment: Tolerated treatment well  Patient would benefit from continued PT  Pt presented with little anterior tibial translation during ambulation, integrated mobilizations to the talus in order to facilitate improved anterior tibial translation  He demonstrated compensations at the hip with ankle 4 way and required tactile cues to inhibit rotation at the hip  Continue to progress as able  Plan: Continue per plan of care  Progress treatment as tolerated         Precautions: Anxiety, Chronic Kidney Disease, Diabetes, High Cholesterol, Hypertension, GERD, hx L TKA 2022    Manuals       Ankle PROM gil  KB      IASTM plantar fascia gil  NV      gastroc / HS stretching  KB       A/P talar mobs  KB GR 4      Neuro Re-Ed        Tandem Balance        SLS Balance        FTEO Foam        FTEC Firm                                Ther Ex        Bike / Nustep (ROM)  x10 min LV 5      Towel Plantar Fascia Stretch 30\"x3 B 30\"x3 B      Seated HS Stretch 30\"x3 B 30\"x3 B c stool      Seated HR; slow eccentric x20 x20      Towel Scrunches 3\"x20 3\"x20 ea      Ankle TB 4 way  seated [place]ach x20                      Ther Activity        Step Up Fwd        Eccentric Step Down                Gait Training                        Modalities                             "

## 2023-05-03 ENCOUNTER — OFFICE VISIT (OUTPATIENT)
Dept: PHYSICAL THERAPY | Facility: CLINIC | Age: 73
End: 2023-05-03

## 2023-05-03 DIAGNOSIS — M76.62 TENDONITIS, ACHILLES, LEFT: ICD-10-CM

## 2023-05-03 DIAGNOSIS — M72.2 PLANTAR FASCIITIS, BILATERAL: Primary | ICD-10-CM

## 2023-05-03 NOTE — PROGRESS NOTES
"Daily Note     Today's date: 5/3/2023  Patient name: Christophe Arechiga  : [birth year]  MRN: [id number]  Referring provider: Melvin Dominguez, *  Dx:   Encounter Diagnosis     ICD-10-CM    1  Plantar fasciitis, bilateral  M72 2       2  Tendonitis, Achilles, left  M76 62                      Subjective: pt reports he is doing well  Reports some pain B plantar fascia and L achilles  Objective: See treatment diary below      Assessment: Tolerated treatment well  Patient demonstrated fatigue post treatment and exhibited good technique with therapeutic exercises  Pt with improved sx in L achilles tendon however cont to c/o pain in B plantar fascia  Notable tightness with STM  Plan: Continue per plan of care  Precautions: Anxiety, Chronic Kidney Disease, Diabetes, High Cholesterol, Hypertension, GERD, hx L TKA 2022    Manuals 4/27 5/1 5/3     Ankle PROM gil  KB JV     IASTM plantar fascia gil  NV      gastroc / HS stretching  KB  JV     A/P talar mobs  KB GR 4      Neuro Re-Ed        Tandem Balance        SLS Balance        FTEO Foam        FTEC Firm                                Ther Ex        Bike / Nustep (ROM)  x10 min LV 5 10' nustep strength     Towel Plantar Fascia Stretch 30\"x3 B 30\"x3 B 20\"/3 b     Seated HS Stretch 30\"x3 B 30\"x3 B c stool 30\"/3 B with stool        Seated HR; slow eccentric x20 x20 20x     Towel Scrunches 3\"x20 3\"x20 ea 3\"/20 ea     Ankle TB 4 way  seated [place]ach x20 L2 2/10 ea     Inb/ev towel pron/sup   20x ea             Ther Activity        Step Up Fwd   6\" 2/10     Eccentric Step Down                Gait Training                        Modalities                             "

## 2023-05-08 ENCOUNTER — OFFICE VISIT (OUTPATIENT)
Dept: PHYSICAL THERAPY | Facility: CLINIC | Age: 73
End: 2023-05-08

## 2023-05-08 DIAGNOSIS — M76.62 TENDONITIS, ACHILLES, LEFT: ICD-10-CM

## 2023-05-08 DIAGNOSIS — M72.2 PLANTAR FASCIITIS, BILATERAL: Primary | ICD-10-CM

## 2023-05-08 NOTE — PROGRESS NOTES
"Daily Note     Today's date: 2023  Patient name: Matthew Jones  :   MRN: [id number]  Referring provider: Osman Crandall, *  Dx:   Encounter Diagnosis     ICD-10-CM    1  Plantar fasciitis, bilateral  M72 2       2  Tendonitis, Achilles, left  M76 62                      Subjective: Patient noted pain in bilateral feet  Objective: See treatment diary below      Assessment: Tolerated treatment fair  Patient was able to perform listed exercises without complaints and correct technique  VC for dosage of exercises throughout treatment  Patient would benefit from continued PT      Plan: Continue per plan of care  Precautions: Anxiety, Chronic Kidney Disease, Diabetes, High Cholesterol, Hypertension, GERD, hx L TKA 2022    Manuals 4/27 5/1 5/3 5/8    Ankle PROM gil  KB JV AC    IASTM plantar fascia gil  NV      gastroc / HS stretching  KB  JV AC    A/P talar mobs  KB GR 4      Neuro Re-Ed        Tandem Balance        SLS Balance        FTEO Foam        FTEC Firm                                Ther Ex        Bike / Nustep (ROM)  x10 min LV 5 10' nustep strength 10' nustep strength    Towel Plantar Fascia Stretch 30\"x3 B 30\"x3 B 20\"/3 b 20\" x 3 B     Seated HS Stretch 30\"x3 B 30\"x3 B c stool 30\"/3 B with stool    30\" x 3 B w/stool    Seated HR; slow eccentric x20 x20 20x 20 x     Towel Scrunches 3\"x20 3\"x20 ea 3\"/20 ea 3\" x 20     Ankle TB 4 way  seated [place] x20 L2 2/10 ea L2 2/10 ea    Inb/ev towel pron/sup   20x ea 20 x ea             Ther Activity        Step Up Fwd   6\" 2/10 6\" 2/10     Eccentric Step Down                Gait Training                        Modalities                             "

## 2023-05-10 ENCOUNTER — OFFICE VISIT (OUTPATIENT)
Dept: PHYSICAL THERAPY | Facility: CLINIC | Age: 73
End: 2023-05-10

## 2023-05-10 DIAGNOSIS — M72.2 PLANTAR FASCIITIS, BILATERAL: Primary | ICD-10-CM

## 2023-05-10 DIAGNOSIS — M76.62 TENDONITIS, ACHILLES, LEFT: ICD-10-CM

## 2023-05-10 NOTE — PROGRESS NOTES
"Daily Note     Today's date: 5/10/2023  Patient name: Javi Benjamin  : [birth year]  MRN: [id number]  Referring provider: Roque Miranda, *  Dx:   Encounter Diagnosis     ICD-10-CM    1  Plantar fasciitis, bilateral  M72 2       2  Tendonitis, Achilles, left  M76 62                      Subjective: Pt reports he is doing well  States cont to have stiffness and pain in B plantar fascia upon waking up  States improved achilles tendon discomfort  Objective: See treatment diary below      Assessment: Tolerated treatment well  Patient exhibited good technique with therapeutic exercises  Pt with cont point tenderness along B Plantar fascia with STM  Cont to Zur[employer] System well and completes with good overall function and strength  Plan: Continue per plan of care  Precautions: Anxiety, Chronic Kidney Disease, Diabetes, High Cholesterol, Hypertension, GERD, hx L TKA 2022    Manuals 4/27 5/1 5/3 5/8 5/10   Ankle PROM gil  KB JV AC JV   IASTM plantar fascia gil  NV      gastroc / HS stretching  KB  JV AC JV   A/P talar mobs  KB GR 4      Neuro Re-Ed        Tandem Balance        SLS Balance        FTEO Foam        FTEC Firm                        Ther Ex        HR/TR standing     /10   Bike / Nustep (ROM)  x10 min LV 5 10' nustep strength 10' nustep strength 10' nustep strength   Towel Plantar Fascia Stretch 30\"x3 B 30\"x3 B 20\"/3 b 20\" x 3 B  20\"/3 B   Seated HS Stretch 30\"x3 B 30\"x3 B c stool 30\"/3 B with stool    30\" x 3 B w/stool 30\"/3 B with stool   Seated HR; slow eccentric x20 x20 20x 20 x  20x   Towel Scrunches 3\"x20 3\"x20 ea 3\"/20 ea 3\" x 20  3\" 30x   Ankle TB 4 way  seated [place] x20 L2 2/10 ea L2 2/10 ea L2 2/10 ea   Inb/ev towel pron/sup   20x ea 20 x ea  20x ea   Standing 1/2 foam calf stretch        Ther Activity        Step Up Fwd   6\" 2/10 6\" 2/10  6\" 2/10   Eccentric Step Down                Gait Training                        Modalities                             "

## 2023-05-15 ENCOUNTER — OFFICE VISIT (OUTPATIENT)
Dept: PHYSICAL THERAPY | Facility: CLINIC | Age: 73
End: 2023-05-15

## 2023-05-15 DIAGNOSIS — M76.62 TENDONITIS, ACHILLES, LEFT: ICD-10-CM

## 2023-05-15 DIAGNOSIS — M72.2 PLANTAR FASCIITIS, BILATERAL: Primary | ICD-10-CM

## 2023-05-15 NOTE — PROGRESS NOTES
"Daily Note     Today's date: 5/15/2023  Patient name: Jayesh Haas  :   MRN: [id number]  Referring provider: Critsal Moreira, *  Dx:   Encounter Diagnosis     ICD-10-CM    1  Plantar fasciitis, bilateral  M72 2       2  Tendonitis, Achilles, left  M76 62                      Subjective: Pt reports he is feeling about the same as last visit  Objective: See treatment diary below      Assessment: Tolerated treatment well  Pt had normal PROM in R ankle, left ankle was limited and noticeably stiffer  Pt responded well to PROM and stretching  Pt performed all exercises without issue  Patient demonstrated fatigue post treatment, exhibited good technique with therapeutic exercises and would benefit from continued PT to increase range, decrease pain, and increase function  Plan: Continue per plan of care  Precautions: Anxiety, Chronic Kidney Disease, Diabetes, High Cholesterol, Hypertension, GERD, hx L TKA 2022    Manuals 4/27 5/1 5/3 5/8 5/10 5/15   Ankle PROM gil  KB JV AC JV KM   IASTM plantar fascia gil  NV       gastroc / HS stretching  KB  JV AC JV KM   A/P talar mobs  KB GR 4       Neuro Re-Ed         Tandem Balance         SLS Balance         FTEO Foam         FTEC Firm                           Ther Ex         HR/TR standing     /10 2x10 ea   Bike / Nustep (ROM)  x10 min LV 5 10' nustep strength 10' nustep strength 10' nustep strength 10' nustep   Towel Plantar Fascia Stretch 30\"x3 B 30\"x3 B 20\"/3 b 20\" x 3 B  20\"/3 B 3\" 20x B   Seated HS Stretch 30\"x3 B 30\"x3 B c stool 30\"/3 B with stool    30\" x 3 B w/stool 30\"/3 B with stool 3x30\" B with stool   Seated HR; slow eccentric x20 x20 20x 20 x  20x 20x   Towel Scrunches 3\"x20 3\"x20 ea 3\"/20 ea 3\" x 20  3\" 30x 3\" 30x   Ankle TB 4 way  seated [place] x20 L2 2/10 ea L2 2/10 ea L2 2/10 ea L2 2x10 ea   Inb/ev towel pron/sup   20x ea 20 x ea  20x ea 20x ea   Standing 1/2 foam calf stretch         Ther Activity         Step Up Fwd   6\" 2/10 " "6\" 2/10  6\" 2/10 6\" 2x10   Eccentric Step Down                  Gait Training                           Modalities                                "

## 2023-05-17 ENCOUNTER — OFFICE VISIT (OUTPATIENT)
Dept: PHYSICAL THERAPY | Facility: CLINIC | Age: 73
End: 2023-05-17

## 2023-05-17 DIAGNOSIS — M76.62 TENDONITIS, ACHILLES, LEFT: ICD-10-CM

## 2023-05-17 DIAGNOSIS — M72.2 PLANTAR FASCIITIS, BILATERAL: Primary | ICD-10-CM

## 2023-05-17 NOTE — PROGRESS NOTES
"Daily Note     Today's date: 2023  Patient name: Frances Frank  : 8/15/1874  MRN: [id number]  Referring provider: Howard Villanueva, *  Dx:   Encounter Diagnosis     ICD-10-CM    1  Plantar fasciitis, bilateral  M72 2       2  Tendonitis, Achilles, left  M76 62                      Subjective: Pt reports seeing MD and is doing well with achilles  Reports cont sx along B plantar fascia  MD would like Graston technique performed  Objective: See treatment diary below  Added IASTM to manual        Assessment: Tolerated treatment well  Patient exhibited good technique with therapeutic exercises  Pt with good jalyn to added IASTM today  Pt with improved sx today with plantar fascia sx post treatment  Plan: Continue per plan of care  Precautions: Anxiety, Chronic Kidney Disease, Diabetes, High Cholesterol, Hypertension, GERD, hx L TKA 2022    Manuals 5/17 5/1 5/3 5/8 5/10 5/15   Ankle PROM gil JV KB JV AC JV KM   IASTM plantar fascia gil JV NV       gastroc / HS stretching JV KB  JV AC JV KM   A/P talar mobs  KB GR 4       Neuro Re-Ed         Tandem Balance         SLS Balance 10\"/4 ea        FTEO Foam         FTEC Firm                           Ther Ex         HR/TR standing 2/10 ea slow ECC    2/10 2x10 ea   Bike / Nustep (ROM) 10' nustep strength x10 min LV 5 10' nustep strength 10' nustep strength 10' nustep strength 10' nustep   Towel Plantar Fascia Stretch 30\"x3 B 30\"x3 B 20\"/3 b 20\" x 3 B  20\"/3 B 3\" 20x B   Seated HS Stretch standing step 8\" 4/20\" ea 30\"x3 B c stool 30\"/3 B with stool    30\" x 3 B w/stool 30\"/3 B with stool 3x30\" B with stool   Seated HR; slow eccentric DC x20 20x 20 x  20x 20x   Towel Scrunches 3\"x20 3\"x20 ea 3\"/20 ea 3\" x 20  3\" 30x 3\" 30x   Ankle TB 4 way L2 2/10 ea seated Peach x20 L2 2/10 ea L2 2/10 ea L2 2/10 ea L2 2x10 ea   Inb/ev towel pron/sup 20x ea  20x ea 20 x ea  20x ea 20x ea   Standing / foam calf stretch \"         Ther Activity         Step Up Fwd " "8\" 2/10  6\" 2/10 6\" 2/10  6\" 2/10 6\" 2x10   Eccentric Step Down                  Gait Training                           Modalities                                "

## 2023-05-22 ENCOUNTER — OFFICE VISIT (OUTPATIENT)
Dept: PHYSICAL THERAPY | Facility: CLINIC | Age: 73
End: 2023-05-22

## 2023-05-22 DIAGNOSIS — M76.62 TENDONITIS, ACHILLES, LEFT: Primary | ICD-10-CM

## 2023-05-22 DIAGNOSIS — M72.2 PLANTAR FASCIITIS, BILATERAL: ICD-10-CM

## 2023-05-22 NOTE — PROGRESS NOTES
"Daily Note     Today's date: 2023  Patient name: Willian Bill  : [birth year]  MRN: [id number]  Referring provider: Tony Orozco, *  Dx:   Encounter Diagnosis     ICD-10-CM    1  Tendonitis, Achilles, left  M76 62       2  Plantar fasciitis, bilateral  M72 2                      Subjective: Pt reports he is doing alright, is a bit stiff today  Objective: See treatment diary below      Assessment: Tolerated treatment well  Pt was able to perform all exercises without issues, continue to progress to tolerance  Pt's L ankle was notably more stiff than the R, focused on ROM and stretching of L ankle  Patient demonstrated fatigue post treatment, exhibited good technique with therapeutic exercises and would benefit from continued PT to increase range, decrease pain, and increase function  Plan: Continue per plan of care  Precautions: Anxiety, Chronic Kidney Disease, Diabetes, High Cholesterol, Hypertension, GERD, hx L TKA 2022    Manuals 5/17 5/22 5/3 5/8 5/10 5/15   Ankle PROM gil JV KM JV AC JV KM   IASTM plantar fascia gil JV        gastroc / HS stretching JV KM JV AC JV KM   A/P talar mobs         Neuro Re-Ed         Tandem Balance         SLS Balance 10\"/4 ea        FTEO Foam         FTEC Firm                           Ther Ex         HR/TR standing 2/10 ea slow ECC 2x10 ea   2/10 2x10 ea   Bike / Nustep (ROM) 10' nustep strength 10' nustep strength 10' nustep strength 10' nustep strength 10' nustep strength 10' nustep   Towel Plantar Fascia Stretch 30\"x3 B 30\"x3 B 20\"/3 b 20\" x 3 B  20\"/3 B 3\" 20x B   Seated HS Stretch standing step 8\" 4/20\" ea 30\"/3 B with stool  30\"/3 B with stool    30\" x 3 B w/stool 30\"/3 B with stool 3x30\" B with stool   Seated HR; slow eccentric DC  20x 20 x  20x 20x   Towel Scrunches 3\"x20 3\"x20 3\"/20 ea 3\" x 20  3\" 30x 3\" 30x   Ankle TB 4 way L2 2/10 ea L2 2x10 ea L2 2/10 ea L2 2/10 ea L2 2/10 ea L2 2x10 ea   Inb/ev towel pron/sup 20x ea 20x ea 20x ea 20 x " "ea  20x ea 20x ea   Standing 1/2 foam calf stretch 4/20\"  4x20\"       Ther Activity         Step Up Fwd 8\" 2/10 8\" 2x10 6\" 2/10 6\" 2/10  6\" 2/10 6\" 2x10   Eccentric Step Down                  Gait Training                           Modalities                                "

## 2023-05-22 NOTE — PROGRESS NOTES
"Daily Note     Today's date: 2023  Patient name: Alayna Avila  :   MRN: [id number]  Referring provider: Elvis Sweeney, *  Dx:   Encounter Diagnosis     ICD-10-CM    1  Plantar fasciitis, bilateral  M72 2       2  Tendonitis, Achilles, left  M76 62                      Subjective:       Objective: See treatment diary below      Assessment: Tolerated treatment {Tolerated treatment :[id number]}  Patient {assessment:[id number]}      Plan: Continue per plan of care  Precautions: Anxiety, Chronic Kidney Disease, Diabetes, High Cholesterol, Hypertension, GERD, hx L TKA 2022    Manuals 5/17 5/22 5/3 5/8 5/10 5/15   Ankle PROM gil JV ML JV AC JV KM   IASTM plantar fascia gil JV ML       gastroc / HS stretching JV ML  JV AC JV KM   A/P talar mobs         Neuro Re-Ed         Tandem Balance         SLS Balance 10\"/4 ea 10\"x4 ea       FTEO Foam         FTEC Firm                           Ther Ex         HR/TR standing 2/10 ea slow ECC 2x10 ea slow Ecc   2/10 2x10 ea   Bike / Nustep (ROM) 10' nustep strength NuStep  Strength 10' 10' nustep strength 10' nustep strength 10' nustep strength 10' nustep   Towel Plantar Fascia Stretch 30\"x3 B 30\"x3 B 20\"/3 b 20\" x 3 B  20\"/3 B 3\" 20x B   Seated HS Stretch standing step 8\" \" ea Standing 8\" step 20\"x4 ea 30\"/3 B with stool    30\" x 3 B w/stool 30\"/3 B with stool 3x30\" B with stool   Seated HR; slow eccentric DC -- 20x 20 x  20x 20x   Towel Scrunches 3\"x20 3\"x20 ea 3\"/20 ea 3\" x 20  3\" 30x 3\" 30x   Ankle TB 4 way L2 2/10 ea L2 2x10 ea L2 2/10 ea L2 2/10 ea L2 2/10 ea L2 2x10 ea   In/ev towel pron/sup 20x ea 20x ea 20x ea 20 x ea  20x ea 20x ea   Standing 1/2 foam calf stretch \"  20\"x4        Ther Activity         Step Up Fwd 8\" 2/10 8\" 2x10 6\" 2/10 6\" 2/10  6\" 2/10 6\" 2x10   Eccentric Step Down                  Gait Training                           Modalities                                "

## 2023-05-24 ENCOUNTER — OFFICE VISIT (OUTPATIENT)
Dept: PHYSICAL THERAPY | Facility: CLINIC | Age: 73
End: 2023-05-24

## 2023-05-24 DIAGNOSIS — M76.62 TENDONITIS, ACHILLES, LEFT: Primary | ICD-10-CM

## 2023-05-24 DIAGNOSIS — M72.2 PLANTAR FASCIITIS, BILATERAL: ICD-10-CM

## 2023-05-24 NOTE — PROGRESS NOTES
"Daily Note     Today's date: 2023  Patient name: Domi Ohara  :   MRN: [id number]  Referring provider: Steven Keenan, *  Dx:   Encounter Diagnosis     ICD-10-CM    1  Tendonitis, Achilles, left  M76 62       2  Plantar fasciitis, bilateral  M72 2                      Subjective: Pt reports increased pain recently in the AM  States pain along B plantar fascia  Pt states some relief for up to 1 day with IASTM  Objective: See treatment diary below      Assessment: Tolerated treatment well  Patient exhibited good technique with therapeutic exercises  Pt cont to be challenged with c/o pain in B plantar fascia  Pt cont to jalyn IASTM well with mild improvement  Plan: Continue per plan of care  RE next visit  Precautions: Anxiety, Chronic Kidney Disease, Diabetes, High Cholesterol, Hypertension, GERD, hx L TKA 2022    Manuals 5/17 5/22 5/24 5/8 5/10 5/15   Ankle PROM gil JV KM  AC JV KM   IASTM plantar fascia gil JV  JV      gastroc / HS stretching JV KM JV AC JV KM   A/P talar mobs         Neuro Re-Ed         Tandem Balance         SLS Balance 10\"/ ea  15\"/3 ea      FTEO Foam         FTEC Firm                           Ther Ex         HR/TR standing /10 ea slow ECC 2x10 ea 2/10 ea  2/10 2x10 ea   Bike / Nustep (ROM) 10' nustep strength 10' nustep strength 10' nustep strength 10' nustep strength 10' nustep strength 10' nustep   Towel Plantar Fascia Stretch 30\"x3 B 30\"x3 B 30\"/3 B 20\" x 3 B  20\"/3 B 3\" 20x B   Seated HS Stretch standing step 8\" \" ea 30\"/3 B with stool  30\"/3 B with stool    30\" x 3 B w/stool 30\"/3 B with stool 3x30\" B with stool   Seated HR; slow eccentric DC  Stand slow ecc 2/10 20 x  20x 20x   Towel Scrunches 3\"x20 3\"x20 3\"/ ea 3\" x 20  3\" 30x 3\" 30x   Ankle TB 4 way L2 2/10 ea L2 2x10 ea  L2 2/10 ea L2 2/10 ea L2 2x10 ea   Inb/ev towel pron/sup 20x ea 20x ea 20x ea 20 x ea  20x ea 20x ea   Standing 1/2 foam calf stretch \"  4x20\" \" ea      Ther " "Activity         Step Up Fwd 8\" 2/10 8\" 2x10 6\" 2/10 6\" 2/10  6\" 2/10 6\" 2x10   Eccentric Step Down                  Gait Training                           Modalities                                "

## 2023-05-26 NOTE — PROGRESS NOTES
PT Re-Evaluation     Today's date: 2023  Patient name: Willian Bill  : 1933  MRN: [id number]  Referring provider: Tony Orozco, *  Dx:   Encounter Diagnosis     ICD-10-CM    1  Tendonitis, Achilles, left  M76 62       2  Plantar fasciitis, bilateral  M72 2                      Assessment  Assessment details:     Impairments: abnormal gait, abnormal or restricted ROM, activity intolerance, impaired balance, impaired physical strength, lacks appropriate home exercise program, pain with function, weight-bearing intolerance and poor posture     Symptom irritability: lowUnderstanding of Dx/Px/POC: good   Prognosis: good    Goals  STG  Patient will decrease pain at worst to 5/10  Patient will demonstrate bilateral ankle dorsiflexion of at least 10 deg  Patient will be independent with basic HEP    LTG  Patient will decrease pain at worst to 1-2/10-partially met  Patient will improve LE strength 1/2 grade in all deficit planes-partially met  Patient will report ability to complete household chores/yardwork without foot pain-partially met  Patient will improve SLS balance to 20 seconds bilaterally-partially met    Plan  Plan details: The patient has shown improvement in PT demonstrating decreased pain, increased range of motion, increased strength, and increased tolerance to activity  The patient continues to present with pain, decreased ROM, decreased strength, and decreased tolerance to activity  The patient would benefit from continued skilled PT services to address these issues and to maximize function  The patient will also continue performing their HEP      Patient would benefit from: skilled physical therapy  Planned modality interventions: cryotherapy and thermotherapy: hydrocollator packs  Planned therapy interventions: manual therapy, neuromuscular re-education, patient education, therapeutic activities, therapeutic exercise, therapeutic training and home exercise program  Frequency: 2x week  Duration in weeks: 8  Treatment plan discussed with: patient        Subjective Evaluation    History of Present Illness  Mechanism of injury: The patient reports having a slight decrease in pain from PT treatment  Yesterday he received injections in both plantar fascia  He notes a slight further decrease in pain so far from the injections  Pain  Current pain rating: 3  At best pain rating: 3  At worst pain ratin  Location: bilateral feet  Quality: dull ache and tight  Relieving factors: ice and rest  Aggravating factors: walking, standing and stair climbing    Social Support  Steps to enter house: yes (2)  Stairs in house: yes (13)   Lives with: spouse    Treatments  No previous or current treatments  Patient Goals  Patient goals for therapy: decreased pain, independence with ADLs/IADLs, increased strength, return to sport/leisure activities, increased motion and improved balance          Objective    Palpation: tenderness noted along bilateral plantar fascia R>L, mild tenderness along achilles tendon insertion L    L Ankle AROM:  Dorsiflexion: 5 degrees  Plantarflexion: 45 degrees  Inversion: 30 degrees  Eversion: 25 degrees    R Ankle AROM:  Dorsiflexion: 10 degrees  Plantarflexion: 50 degrees  Inversion: 30 degrees  Eversion: 25 degrees    Ankle Strength (L,R)  Dorsiflexion: 5/5 , 5/5  Inversion: 4 5/5 , 4 5/5  Eversion: 4 5/5 , 4 5/5    Heel Raises: DL: decreased heel height on left    Joint Mobility: WFL    Flexibility: tightness in bilateral hamstrings      Balance:  FTEC: Deferred  Tandem R foot fwd: Deferred L foot fwd: Deferred  SLS R: 15 seconds   SLS L: 16 second    Gait: decreased heel strike and push off LLE        Precautions: Anxiety, Chronic Kidney Disease, Diabetes, High Cholesterol, Hypertension, GERD, hx L TKA 2022     Manuals 5/17 5/22 5/24 5/31 5/10 5/15   Ankle PROM gil JV KM    JV KM   IASTM plantar fascia gil JV   JV  RK       gastroc / HS stretching JV KM JTHERON  JTHERON KM   A/P talar "mobs               Neuro Re-Ed               Tandem Balance               SLS Balance 10\"/4 ea   15\"/3 ea  15\"/3 ea       FTEO Foam               FTEC Firm                                               Ther Ex               HR/TR standing 2/10 ea slow ECC 2x10 ea 2/10 ea  2/10 ea 2/10 2x10 ea   Bike / Nustep (ROM) 10' nustep strength 10' nustep strength 10' nustep strength 10' nustep strength 10' nustep strength 10' nustep   Towel Plantar Fascia Stretch 30\"x3 B 30\"x3 B 30\"/3 B 30\" x 3 B  20\"/3 B 3\" 20x B   Seated HS Stretch standing step 8\" 4/20\" ea 30\"/3 B with stool  30\"/3 B with stool    30\" x 3 B w/stool 30\"/3 B with stool 3x30\" B with stool   Seated HR; slow eccentric DC   Stand slow ecc 2/10 Stand slow ecc 2/10 20x 20x   Towel Scrunches 3\"x20 3\"x20 3\"/20 ea 3\" x 20  3\" 30x 3\" 30x   Ankle TB 4 way L2 2/10 ea L2 2x10 ea   L2 2/10 ea L2 2/10 ea L2 2x10 ea   Inb/ev towel pron/sup 20x ea 20x ea 20x ea 20 x ea  20x ea 20x ea   Standing 1/2 foam calf stretch 4/20\"  4x20\" 4/20\" ea  4/20\"ea       Ther Activity               Step Up Fwd 8\" 2/10 8\" 2x10 6\" 2/10 6\" 2/10  6\" 2/10 6\" 2x10   Eccentric Step Down                               Gait Training                                               Modalities                                                       "

## 2023-05-31 ENCOUNTER — EVALUATION (OUTPATIENT)
Dept: PHYSICAL THERAPY | Facility: CLINIC | Age: 73
End: 2023-05-31

## 2023-05-31 DIAGNOSIS — M76.62 TENDONITIS, ACHILLES, LEFT: Primary | ICD-10-CM

## 2023-05-31 DIAGNOSIS — M72.2 PLANTAR FASCIITIS, BILATERAL: ICD-10-CM

## 2023-05-31 NOTE — LETTER
May 31, 2023    Dwight GardenIain  [address] [address]    Patient: Vira Rudolph   YOB: 1950   Date of Visit: 2023     Encounter Diagnosis     ICD-10-CM    1  Tendonitis, Achilles, left  M76 62       2  Plantar fasciitis, bilateral  M72 2           Dear Dr Salma Villalta:    Thank you for your recent referral of Vira Rudolph  Please review the attached evaluation summary from Jay Jay's recent visit  Please verify that you agree with the plan of care by signing the attached order  If you have any questions or concerns, please do not hesitate to call  I sincerely appreciate the opportunity to share in the care of one of your patients and hope to have another opportunity to work with you in the near future  Sincerely,    Dorota Chacon, PT      Referring Provider:      I certify that I have read the below Plan of Care and certify the need for these services furnished under this plan of treatment while under my care  Dwight Iain Mcmillan  [address] [address]  Via Fax: [phone]          PT Re-Evaluation     Today's date: 2023  Patient name: Vira Rudolph  : [birth year]  MRN: [id number]  Referring provider: Nilam Holland, *  Dx:   Encounter Diagnosis     ICD-10-CM    1  Tendonitis, Achilles, left  M76 62       2  Plantar fasciitis, bilateral  M72 2                      Assessment  Assessment details:     Impairments: abnormal gait, abnormal or restricted ROM, activity intolerance, impaired balance, impaired physical strength, lacks appropriate home exercise program, pain with function, weight-bearing intolerance and poor posture     Symptom irritability: lowUnderstanding of Dx/Px/POC: good   Prognosis: good    Goals  STG  Patient will decrease pain at worst to 5/10  Patient will demonstrate bilateral ankle dorsiflexion of at least 10 deg    Patient will be independent with basic HEP    LTG  Patient will decrease pain at worst to 1-2/10-partially met  Patient will improve LE strength 1/2 grade in all deficit planes-partially met  Patient will report ability to complete household chores/yardwork without foot pain-partially met  Patient will improve SLS balance to 20 seconds bilaterally-partially met    Plan  Plan details: The patient has shown improvement in PT demonstrating decreased pain, increased range of motion, increased strength, and increased tolerance to activity  The patient continues to present with pain, decreased ROM, decreased strength, and decreased tolerance to activity  The patient would benefit from continued skilled PT services to address these issues and to maximize function  The patient will also continue performing their HEP  Patient would benefit from: skilled physical therapy  Planned modality interventions: cryotherapy and thermotherapy: hydrocollator packs  Planned therapy interventions: manual therapy, neuromuscular re-education, patient education, therapeutic activities, therapeutic exercise, therapeutic training and home exercise program  Frequency: 2x week  Duration in weeks: 8  Treatment plan discussed with: patient        Subjective Evaluation    History of Present Illness  Mechanism of injury: The patient reports having a slight decrease in pain from PT treatment  Yesterday he received injections in both plantar fascia  He notes a slight further decrease in pain so far from the injections    Pain  Current pain rating: 3  At best pain rating: 3  At worst pain ratin  Location: bilateral feet  Quality: dull ache and tight  Relieving factors: ice and rest  Aggravating factors: walking, standing and stair climbing    Social Support  Steps to enter house: yes (2)  Stairs in house: yes (13)   Lives with: spouse    Treatments  No previous or current treatments  Patient Goals  Patient goals for therapy: decreased pain, independence with ADLs/IADLs, increased strength, return to "sport/leisure activities, increased motion and improved balance          Objective    Palpation: tenderness noted along bilateral plantar fascia R>L, mild tenderness along achilles tendon insertion L    L Ankle AROM:  Dorsiflexion: 5 degrees  Plantarflexion: 45 degrees  Inversion: 30 degrees  Eversion: 25 degrees    R Ankle AROM:  Dorsiflexion: 10 degrees  Plantarflexion: 50 degrees  Inversion: 30 degrees  Eversion: 25 degrees    Ankle Strength (L,R)  Dorsiflexion: 5/5 , 5/5  Inversion: 4 5/5 , 4 5/5  Eversion: 4 5/5 , 4 5/5    Heel Raises: DL: decreased heel height on left    Joint Mobility: WFL    Flexibility: tightness in bilateral hamstrings  Balance:  FTEC: Deferred  Tandem R foot fwd: Deferred L foot fwd: Deferred  SLS R: 15 seconds   SLS L: 16 second    Gait: decreased heel strike and push off LLE       Precautions: Anxiety, Chronic Kidney Disease, Diabetes, High Cholesterol, Hypertension, GERD, hx L TKA 12/2022     Manuals 5/17 5/22 5/24 5/31 5/10 5/15   Ankle PROM gil JV KM    JV KM   IASTM plantar fascia gil JV   JV  RK       gastroc / HS stretching JV KM JV  JV KM   A/P talar mobs               Neuro Re-Ed               Tandem Balance               SLS Balance 10\"/4 ea   15\"/3 ea  15\"/3 ea       FTEO Foam               FTEC Firm                                               Ther Ex               HR/TR standing 2/10 ea slow ECC 2x10 ea 2/10 ea  2/10 ea 2/10 2x10 ea   Bike / Nustep (ROM) 10' nustep strength 10' nustep strength 10' nustep strength 10' nustep strength 10' nustep strength 10' nustep   Towel Plantar Fascia Stretch 30\"x3 B 30\"x3 B 30\"/3 B 30\" x 3 B  20\"/3 B 3\" 20x B   Seated HS Stretch standing step 8\" 4/20\" ea 30\"/3 B with stool  30\"/3 B with stool    30\" x 3 B w/stool 30\"/3 B with stool 3x30\" B with stool   Seated HR; slow eccentric DC   Stand slow ecc 2/10 Stand slow ecc 2/10 20x 20x   Towel Scrunches 3\"x20 3\"x20 3\"/20 ea 3\" x 20  3\" 30x 3\" 30x   Ankle TB 4 way L2 2/10 ea L2 2x10 ea   L2 " "2/10 ea L2 2/10 ea L2 2x10 ea   Inb/ev towel pron/sup 20x ea 20x ea 20x ea 20 x ea  20x ea 20x ea   Standing 1/2 foam calf stretch 4/20\"  4x20\" 4/20\" ea  4/20\"ea       Ther Activity               Step Up Fwd 8\" 2/10 8\" 2x10 6\" 2/10 6\" 2/10  6\" 2/10 6\" 2x10   Eccentric Step Down                               Gait Training                                               Modalities                                                                      "

## 2023-06-05 ENCOUNTER — OFFICE VISIT (OUTPATIENT)
Dept: PHYSICAL THERAPY | Facility: CLINIC | Age: 73
End: 2023-06-05
Payer: COMMERCIAL

## 2023-06-05 DIAGNOSIS — M72.2 PLANTAR FASCIITIS, BILATERAL: ICD-10-CM

## 2023-06-05 DIAGNOSIS — M76.62 TENDONITIS, ACHILLES, LEFT: Primary | ICD-10-CM

## 2023-06-05 PROCEDURE — 97140 MANUAL THERAPY 1/> REGIONS: CPT

## 2023-06-05 PROCEDURE — 97112 NEUROMUSCULAR REEDUCATION: CPT

## 2023-06-05 PROCEDURE — 97110 THERAPEUTIC EXERCISES: CPT

## 2023-06-05 NOTE — PROGRESS NOTES
"Daily Note     Today's date: 2023  Patient name: Jordan Ledesma  :   MRN: [id number]  Referring provider: Calderon Castro, *  Dx:   Encounter Diagnosis     ICD-10-CM    1  Tendonitis, Achilles, left  M76 62       2  Plantar fasciitis, bilateral  M72 2                      Subjective: Pt reports having some improvement in B plantar fascia pain at end of last week however walked a lot and did many steps having increased soreness in B feet  Reports improved sx today  Objective: See treatment diary below      Assessment: Tolerated treatment well  Patient exhibited good technique with therapeutic exercises  Pt with improved sx today with program  Cont to jalyn exercises well with min increased pain  Plan: Continue per plan of care  Precautions: Anxiety, Chronic Kidney Disease, Diabetes, High Cholesterol, Hypertension, GERD, hx L TKA 2022     Manuals 5/17 5/22 5/24 5/31 6/5 5/15   Ankle PROM gil JV KM     KM   IASTM plantar fascia gil JV   JV  RK  JV     gastroc / HS stretching JV KM JV  JV KM   A/P talar mobs               Neuro Re-Ed               Tandem Balance               SLS Balance 10\"/ ea   15\"/3 ea  15\"/3 ea 15\"/3     FTEO Foam               FTEC Firm                                               Ther Ex               HR/TR standing 2/10 ea slow ECC 2x10 ea 2/10 ea  2/10 ea 2/10 2x10 ea   Bike / Nustep (ROM) 10' nustep strength 10' nustep strength 10' nustep strength 10' nustep strength 10' nustep strength 10' nustep   Towel Plantar Fascia Stretch 30\"x3 B 30\"x3 B 30\"/3 B 30\" x 3 B  30\"/3 B 3\" 20x B   Seated HS Stretch standing step 8\" \" ea 30\"/3 B with stool  30\"/3 B with stool    30\" x 3 B w/stool 30\"/3 B with stool 3x30\" B with stool   Seated HR; slow eccentric DC   Stand slow ecc 2/10 Stand slow ecc 2/10 20x stand slow ecc  20x   Towel Scrunches 3\"x20 3\"x20 3\" ea 3\" x 20  3\" 30x 3\" 30x   Ankle TB 4 way L2 2/10 ea L2 2x10 ea   L2 2/10 ea L2 2/10 ea L2 2x10 ea " "  Inb/ev towel pron/sup 20x ea 20x ea 20x ea 20 x ea  20x ea 20x ea   Standing 1/2 foam calf stretch 4/20\"  4x20\" 4/20\" ea  4/20\"ea  4/20\" ea     Ther Activity               Step Up Fwd 8\" 2/10 8\" 2x10 6\" 2/10 6\" 2/10  6\" 2/10 6\" 2x10   Eccentric Step Down                               Gait Training                                               Modalities                                                         "

## 2023-06-07 ENCOUNTER — OFFICE VISIT (OUTPATIENT)
Dept: PHYSICAL THERAPY | Facility: CLINIC | Age: 73
End: 2023-06-07
Payer: COMMERCIAL

## 2023-06-07 DIAGNOSIS — M72.2 PLANTAR FASCIITIS, BILATERAL: ICD-10-CM

## 2023-06-07 DIAGNOSIS — M76.62 TENDONITIS, ACHILLES, LEFT: Primary | ICD-10-CM

## 2023-06-07 PROCEDURE — 97140 MANUAL THERAPY 1/> REGIONS: CPT

## 2023-06-07 PROCEDURE — 97112 NEUROMUSCULAR REEDUCATION: CPT

## 2023-06-07 PROCEDURE — 97110 THERAPEUTIC EXERCISES: CPT

## 2023-06-07 NOTE — PROGRESS NOTES
"Daily Note     Today's date: 2023  Patient name: Dave Almaguer  :   MRN: [id number]  Referring provider: Patt Zhu, *  Dx:   Encounter Diagnosis     ICD-10-CM    1  Tendonitis, Achilles, left  M76 62       2  Plantar fasciitis, bilateral  M72 2                      Subjective:       Objective: See treatment diary below      Assessment: Tolerated treatment {Tolerated treatment :[id number]}  Patient {assessment:[id number]}      Plan: Continue per plan of care  Precautions: Anxiety, Chronic Kidney Disease, Diabetes, High Cholesterol, Hypertension, GERD, hx L TKA 2022     Manuals    Ankle PROM mark JV KM        IASTM plantar fascia mark JV   JV  RK  JV ML    gastroc / HS stretching JV KM JV  JV ML   A/P talar mobs               Neuro Re-Ed               Tandem Balance               SLS Balance 10\"/ ea   15\"/3 ea  15\"/3 ea 15\"/3 15\"x3   FTEO Foam               FTEC Firm                                               Ther Ex               HR/TR standing 2/10 ea slow ECC 2x10 ea 2/10 ea  2/10 ea 2/10 2x10 ea   Bike / Nustep (ROM) 10' nustep strength 10' nustep strength 10' nustep strength 10' nustep strength 10' nustep strength 10' nustep for strength   Towel Plantar Fascia Stretch 30\"x3 B 30\"x3 B 30\"/3 B 30\" x 3 B  30\"/3 B 30\"x3 Mark   Seated HS Stretch standing step 8\" \" ea 30\"/3 B with stool  30\"/3 B with stool    30\" x 3 B w/stool 30\"/3 B with stool 3x30\" B with stool   Seated HR; slow eccentric DC   Stand slow ecc 2/10 Stand slow ecc 2/10 20x stand slow ecc  20x stand slow ecc   Towel Scrunches 3\"x20 3\"x20 3\" ea 3\" x 20  3\" 30x 3\" 30x   Ankle TB 4 way L2 2/10 ea L2 2x10 ea   L2 2/10 ea L2 2/10 ea L2 2x10 ea   Inb/ev towel pron/sup 20x ea 20x ea 20x ea 20 x ea  20x ea 20x ea   Standing 1/ foam calf stretch \"  4x20\" \" ea  \"ea  \" ea 20\"x4 ea   Ther Activity               Step Up Fwd 8\" 210 8\" 2x10 6\" 2/10 6\" 2/10  6\" 2/10 6\" 2x10 " Eccentric Step Down                               Gait Training                                               Modalities

## 2023-06-07 NOTE — PROGRESS NOTES
"Daily Note     Today's date: 2023  Patient name: Raeann Tai  : [id number]  MRN: [id number]  Referring provider: Latoya Garg, *  Dx:   Encounter Diagnosis     ICD-10-CM    1  Tendonitis, Achilles, left  M76 62       2  Plantar fasciitis, bilateral  M72 2           Start Time: 1300  Stop Time: 1400  Total time in clinic (min): 60 minutes    Subjective: Patient reports his ankle is okay but both his feet are hurting with adls  Patient reports he had injections recently that only lasted for a day but he was doing a lot of stairs the day after  Objective: See treatment diary below      Assessment: Tolerated treatment well  Patient exhibited good technique with therapeutic exercises  Cuing given throughout session for proper form  No new complaints voiced throughout today's session  R>L plantar fascial tightness today  Plan: Continue per plan of care  Precautions: Anxiety, Chronic Kidney Disease, Diabetes, High Cholesterol, Hypertension, GERD, hx L TKA 2022     Manuals    Ankle PROM gil JV KM     KL   IASTM plantar fascia gil JV   JV  RK  JV  STM KL   gastroc / HS stretching JV KM JV  JV KL   A/P talar mobs               Neuro Re-Ed               Tandem Balance               SLS Balance 10\"/ ea   15\"/3 ea  15\"/3 ea 15\"/3  15\"x3   FTEO Foam               FTEC Firm                                               Ther Ex               HR/TR standing 2/10 ea slow ECC 2x10 ea 2/10 ea  2/10 ea 2/10 2x10 ea   Bike / Nustep (ROM) 10' nustep strength 10' nustep strength 10' nustep strength 10' nustep strength 10' nustep strength 10' nustep   Towel Plantar Fascia Stretch 30\"x3 B 30\"x3 B 30\"/3 B 30\" x 3 B  30\"/3 B 30\"x3 B   Seated HS Stretch standing step 8\" 20\" ea 30\"/3 B with stool  30\"/3 B with stool    30\" x 3 B w/stool 30\"/3 B with stool 3x30\" B with stool   Seated HR; slow eccentric DC   Stand slow ecc 2/10 Stand slow ecc 2/10 20x stand slow ecc  20x stand " "slow ecc   Towel Scrunches 3\"x20 3\"x20 3\"/20 ea 3\" x 20  3\" 30x 3\" 30x   Ankle TB 4 way L2 2/10 ea L2 2x10 ea   L2 2/10 ea L2 2/10 ea L2 2x10 ea   Inb/ev towel pron/sup 20x ea 20x ea 20x ea 20 x ea  20x ea 20x ea   Standing 1/2 foam calf stretch 4/20\"  4x20\" 4/20\" ea  4/20\"ea  4/20\" ea  4/20\" ea   Ther Activity               Step Up Fwd 8\" 2/10 8\" 2x10 6\" 2/10 6\" 2/10  6\" 2/10 6\" 2x10   Eccentric Step Down                               Gait Training                                               Modalities                                                         "

## 2023-06-12 ENCOUNTER — OFFICE VISIT (OUTPATIENT)
Dept: PHYSICAL THERAPY | Facility: CLINIC | Age: 73
End: 2023-06-12
Payer: COMMERCIAL

## 2023-06-12 DIAGNOSIS — M72.2 PLANTAR FASCIITIS, BILATERAL: ICD-10-CM

## 2023-06-12 DIAGNOSIS — M76.62 TENDONITIS, ACHILLES, LEFT: Primary | ICD-10-CM

## 2023-06-12 PROCEDURE — 97110 THERAPEUTIC EXERCISES: CPT

## 2023-06-12 PROCEDURE — 97140 MANUAL THERAPY 1/> REGIONS: CPT

## 2023-06-12 PROCEDURE — 97112 NEUROMUSCULAR REEDUCATION: CPT

## 2023-06-12 NOTE — PROGRESS NOTES
"Daily Note     Today's date: 2023  Patient name: Jessica Rodriguez  :   MRN: [id number]  Referring provider: Emilee Belcher, *  Dx:   Encounter Diagnosis     ICD-10-CM    1  Tendonitis, Achilles, left  M76 62       2  Plantar fasciitis, bilateral  M72 2                      Subjective: Pt reports B feet were doing better however did some yard work and had increased pain  Reports icing helps after a few days  Objective: See treatment diary below      Assessment: Tolerated treatment well  Patient exhibited good technique with therapeutic exercises  Pt cont to jalyn program well with min increased pain  Cont to have improved sx with current program  Will cont to benefit from PT  Plan: Continue per plan of care  Precautions: Anxiety, Chronic Kidney Disease, Diabetes, High Cholesterol, Hypertension, GERD, hx L TKA 2022     Manuals    Ankle PROM gil  KM     KL   IASTM plantar fascia gil JV   JV  RK  JV  STM KL   gastroc / HS stretching JV KM JV  JV KL   A/P talar mobs               Neuro Re-Ed               Tandem Balance               SLS Balance 15\"/3 ea   15\"/3 ea  15\"/3 ea 15\"/3  15\"x3   FTEO Foam               FTEC Firm                                               Ther Ex               HR/TR standing /10 ea slow ECC 2x10 ea /10 ea  /10 ea 10 2x10 ea   Bike / Nustep (ROM) 10' nustep strength 10' nustep strength 10' nustep strength 10' nustep strength 10' nustep strength 10' nustep   Towel Plantar Fascia Stretch 30\"x3 B 30\"x3 B 30\"/3 B 30\" x 3 B  30\"/3 B 30\"x3 B   Seated HS Stretch NV 30\"/3 B with stool  30\"/3 B with stool    30\" x 3 B w/stool 30\"/3 B with stool 3x30\" B with stool   Seated HR; slow eccentric DC   Stand slow ecc 2/10 Stand slow ecc 2/10 20x stand slow ecc  20x stand slow ecc   Towel Scrunches 3\"x20 3\"x20 3\"/20 ea 3\" x 20  3\" 30x 3\" 30x   Ankle TB 4 way L2 2/10 ea L2 2x10 ea   L2 2/10 ea L2 2/10 ea L2 2x10 ea   Inb/ev towel pron/sup " "20x ea 20x ea 20x ea 20 x ea  20x ea 20x ea   Standing 1/2 foam calf stretch 4/20\"  4x20\" 4/20\" ea  4/20\"ea  4/20\" ea  4/20\" ea   Ther Activity               Step Up Fwd 8\" 2/10 8\" 2x10 6\" 2/10 6\" 2/10  6\" 2/10 6\" 2x10   Eccentric Step Down                               Gait Training                                               Modalities                                                           "

## 2023-06-14 ENCOUNTER — OFFICE VISIT (OUTPATIENT)
Dept: PHYSICAL THERAPY | Facility: CLINIC | Age: 73
End: 2023-06-14
Payer: COMMERCIAL

## 2023-06-14 DIAGNOSIS — M76.62 TENDONITIS, ACHILLES, LEFT: Primary | ICD-10-CM

## 2023-06-14 DIAGNOSIS — M72.2 PLANTAR FASCIITIS, BILATERAL: ICD-10-CM

## 2023-06-14 PROCEDURE — 97140 MANUAL THERAPY 1/> REGIONS: CPT

## 2023-06-14 PROCEDURE — 97112 NEUROMUSCULAR REEDUCATION: CPT

## 2023-06-14 PROCEDURE — 97110 THERAPEUTIC EXERCISES: CPT

## 2023-06-14 NOTE — PROGRESS NOTES
"Daily Note     Today's date: 2023  Patient name: Jordan Ledesma  :   MRN: [id number]  Referring provider: Calderon Castro, *  Dx:   Encounter Diagnosis     ICD-10-CM    1  Tendonitis, Achilles, left  M76 62       2  Plantar fasciitis, bilateral  M72 2                      Subjective: Pt reports he had increased cramping in B feet at night  Pt states icing helps relax and improve sx  Objective: See treatment diary below      Assessment: Tolerated treatment well  Patient exhibited good technique with therapeutic exercises  Pt making cont slow improvement  Pt cont to have pain in B plantar fascia  Pt ed on icing and night splints  Plan: Continue per plan of care  Precautions: Anxiety, Chronic Kidney Disease, Diabetes, High Cholesterol, Hypertension, GERD, hx L TKA 2022     Manuals    Ankle PROM gil       KL   IASTM plantar fascia gil JV  JV JV  RK  JV  STM KL   gastroc / HS stretching JV JV JV  JV KL   A/P talar mobs               Neuro Re-Ed               Tandem Balance               SLS Balance 15\"/3 ea  15\"/3 ea 15\"/3 ea  15\"/3 ea 15\"/3  15\"x3   FTEO Foam               FTEC Firm                                               Ther Ex               HR/TR standing 2/10 ea slow ECC 2x10 ea slow ecc 2/10 ea  2/10 ea 2/10 2x10 ea   Bike / Nustep (ROM) 10' nustep strength 10' nustep strength 10' nustep strength 10' nustep strength 10' nustep strength 10' nustep   Towel Plantar Fascia Stretch 30\"x3 B 30\"x3 B 30\"/3 B 30\" x 3 B  30\"/3 B 30\"x3 B   Seated HS Stretch NV DC  30\"/3 B with stool    30\" x 3 B w/stool 30\"/3 B with stool 3x30\" B with stool   Seated HR; slow eccentric DC   Stand slow ecc 2/10 Stand slow ecc 2/10 20x stand slow ecc  20x stand slow ecc   Towel Scrunches 3\"x20 3\"x20 3\"/20 ea 3\" x 20  3\" 30x 3\" 30x   Ankle TB 4 way L2 2/10 ea L3 2x10 ea   L2 2/10 ea L2 2/10 ea L2 2x10 ea   Inb/ev towel pron/sup 20x ea 20x ea 20x ea 20 x ea  20x ea 20x ea " "  Standing 1/2 foam calf stretch 4/20\"  4x20\" 4/20\" ea  4/20\"ea  4/20\" ea  4/20\" ea   Ther Activity               Step Up Fwd 8\" 2/10 8\" 2x10 6\" 2/10 6\" 2/10  6\" 2/10 6\" 2x10   Eccentric Step Down                               Gait Training                                               Modalities                                                             "

## 2023-06-19 ENCOUNTER — OFFICE VISIT (OUTPATIENT)
Dept: PHYSICAL THERAPY | Facility: CLINIC | Age: 73
End: 2023-06-19
Payer: COMMERCIAL

## 2023-06-19 DIAGNOSIS — M72.2 PLANTAR FASCIITIS, BILATERAL: ICD-10-CM

## 2023-06-19 DIAGNOSIS — M76.62 TENDONITIS, ACHILLES, LEFT: Primary | ICD-10-CM

## 2023-06-19 PROCEDURE — 97112 NEUROMUSCULAR REEDUCATION: CPT

## 2023-06-19 PROCEDURE — 97140 MANUAL THERAPY 1/> REGIONS: CPT

## 2023-06-19 PROCEDURE — 97110 THERAPEUTIC EXERCISES: CPT

## 2023-06-19 NOTE — PROGRESS NOTES
"Daily Note     Today's date: 2023  Patient name: Monica Mcfadden  : [id number]  MRN: [id number]  Referring provider: Ace Ortiz, *  Dx:   Encounter Diagnosis     ICD-10-CM    1  Tendonitis, Achilles, left  M76 62       2  Plantar fasciitis, bilateral  M72 2                      Subjective: Pt reports he had the two best days with his ankle pain followed by a bad day on  after working outside  Objective: See treatment diary below  Continue with current program       Assessment: Tolerated treatment well   No exacerbation of symptoms during session  Plan: Progress treatment as tolerated         Precautions: Anxiety, Chronic Kidney Disease, Diabetes, High Cholesterol, Hypertension, GERD, hx L TKA 2022     Manuals    Ankle PROM gil       KL   IASTM plantar fascia gil JV  JV TS  RK  JV  STM KL   gastroc / HS stretching JV JV TS  JV KL   A/P talar mobs               Neuro Re-Ed               Tandem Balance               SLS Balance 15\"/3 ea  15\"/3 ea 15\"/3 ea  15\"/3 ea 15\"/3  15\"x3   FTEO Foam               FTEC Firm                                               Ther Ex               HR/TR standing 2/10 ea slow ECC 2x10 ea slow ecc 2/10 ea  2/10 ea 2/10 2x10 ea   Bike / Nustep (ROM) 10' nustep strength 10' nustep strength 10' nustep strength 10' nustep strength 10' nustep strength 10' nustep   Towel Plantar Fascia Stretch 30\"x3 B 30\"x3 B 30\"/3 B 30\" x 3 B  30\"/3 B 30\"x3 B   Seated HS Stretch NV DC  DC  30\" x 3 B w/stool 30\"/3 B with stool 3x30\" B with stool   Seated HR; slow eccentric DC   DC Stand slow ecc 2/10 20x stand slow ecc  20x stand slow ecc   Towel Scrunches 3\"x20 3\"x20 3\"/20 ea 3\" x 20  3\" 30x 3\" 30x   Ankle TB 4 way L2 2/10 ea L3 2x10 ea  L3 2x10 L2 2/10 ea L2 2/10 ea L2 2x10 ea   Inb/ev towel pron/sup 20x ea 20x ea 20x ea 20 x ea  20x ea 20x ea   Standing 1/2 foam calf stretch \"  4x20\" \" ea  \"ea  \" ea  \" ea   Ther Activity       " ----- Message from Dallas Cueto sent at 2/12/2020 11:36 AM CST -----  Contact: Pt  Pt need a referral to be seen in opthalmology dept. The pt has Romelia Silva    Pt# [phone] or neighbor/  Verónica Roca [phone]   "        Step Up Fwd 8\" 2/10 8\" 2x10 8\" 2/10 6\" 2/10  6\" 2/10 6\" 2x10   Eccentric Step Down                               Gait Training                                               Modalities                                                               "

## 2023-06-21 ENCOUNTER — OFFICE VISIT (OUTPATIENT)
Dept: PHYSICAL THERAPY | Facility: CLINIC | Age: 73
End: 2023-06-21
Payer: COMMERCIAL

## 2023-06-21 DIAGNOSIS — M72.2 PLANTAR FASCIITIS, BILATERAL: ICD-10-CM

## 2023-06-21 DIAGNOSIS — M76.62 TENDONITIS, ACHILLES, LEFT: Primary | ICD-10-CM

## 2023-06-21 PROCEDURE — 97140 MANUAL THERAPY 1/> REGIONS: CPT

## 2023-06-21 PROCEDURE — 97112 NEUROMUSCULAR REEDUCATION: CPT

## 2023-06-21 PROCEDURE — 97110 THERAPEUTIC EXERCISES: CPT

## 2023-06-21 NOTE — PROGRESS NOTES
"Daily Note     Today's date: 2023  Patient name: Angelo Mojica  : [id number]  MRN: [id number]  Referring provider: Ximena Jackman, *  Dx:   Encounter Diagnosis     ICD-10-CM    1  Tendonitis, Achilles, left  M76 62       2  Plantar fasciitis, bilateral  M72 2                      Subjective: Pt reports cont to feel better with icing however cont to have increased sx with activity  Reports frustration at this time  Objective: See treatment diary below      Assessment: Tolerated treatment well  Patient exhibited good technique with therapeutic exercises  Pt making cont slow steady gains with program   Cont to have c/o 4/10 on average pain in B feet  Pt cont to jalyn program well with with IASTM  Plan: Continue per plan of care        Precautions: Anxiety, Chronic Kidney Disease, Diabetes, High Cholesterol, Hypertension, GERD, hx L TKA 2022     Manuals    Ankle PROM gil       KL   IASTM plantar fascia gil JV  JV TS  RK  JV  STM KL   gastroc / HS stretching JV JV TS  JV KL   A/P talar mobs               Neuro Re-Ed               Tandem Balance               SLS Balance 15\"/3 ea  15\"/3 ea 15\"/3 ea  15\"/3 ea 15\"/3  15\"x3   FTEO Foam               FTEC Firm                                               Ther Ex               HR/TR standing 2/10 ea slow ECC 2x10 ea slow ecc 2/10 ea  2/10 ea 2/10 2x10 ea   Bike / Nustep (ROM) 10' nustep strength 10' nustep strength 10' nustep strength 10' nustep strength 10' nustep strength 10' nustep   Towel Plantar Fascia Stretch 30\"x3 B 30\"x3 B 30\"/3 B 30\" x 3 B  30\"/3 B 30\"x3 B   Seated HS Stretch NV DC  DC  30\" x 3 B w/stool 30\"/3 B with stool 3x30\" B with stool   Seated HR; slow eccentric DC   DC Stand slow ecc 2/10 20x stand slow ecc  20x stand slow ecc   Towel Scrunches 3\"x20 3\"x20 3\"/20 ea 3\" x 20  3\" 30x 3\" 30x   Ankle TB 4 way L2 2/10 ea L3 2x10 ea  L3 2x10 L2 2/10 ea L2 2/10 ea L2 2x10 ea   Inb/ev towel pron/sup 20x ea 20x ea " "20x ea 20 x ea  20x ea 20x ea   Standing 1/2 foam calf stretch 4/20\"  4x20\" 4/20\" ea  4/20\"ea  4/20\" ea  4/20\" ea   Ther Activity               Step Up Fwd 8\" 2/10 8\" 2x10 8\" 2/10 6\" 2/10  6\" 2/10 6\" 2x10   Eccentric Step Down                               Gait Training                                               Modalities                                                                 "

## 2023-06-26 ENCOUNTER — OFFICE VISIT (OUTPATIENT)
Dept: PHYSICAL THERAPY | Facility: CLINIC | Age: 73
End: 2023-06-26
Payer: COMMERCIAL

## 2023-06-26 DIAGNOSIS — M76.62 TENDONITIS, ACHILLES, LEFT: Primary | ICD-10-CM

## 2023-06-26 DIAGNOSIS — M72.2 PLANTAR FASCIITIS, BILATERAL: ICD-10-CM

## 2023-06-26 PROCEDURE — 97140 MANUAL THERAPY 1/> REGIONS: CPT

## 2023-06-26 PROCEDURE — 97110 THERAPEUTIC EXERCISES: CPT

## 2023-06-26 PROCEDURE — 97112 NEUROMUSCULAR REEDUCATION: CPT

## 2023-06-26 NOTE — PROGRESS NOTES
"Daily Note     Today's date: 2023  Patient name: Emilia Hammond  : [birth year]  MRN: [id number]  Referring provider: Jefry Bennett, *  Dx:   Encounter Diagnosis     ICD-10-CM    1  Tendonitis, Achilles, left  M76 62       2  Plantar fasciitis, bilateral  M72 2                      Subjective: Pt reports improved L foot pain today however cont to have pain in R        Objective: See treatment diary below      Assessment: Tolerated treatment well  Patient exhibited good technique with therapeutic exercises  Pt with cont pain today in R plantar fascia towards Metatarsal arch  Pt jalyn exercises well with mild c/o pain  Plan: Continue per plan of care        Precautions: Anxiety, Chronic Kidney Disease, Diabetes, High Cholesterol, Hypertension, GERD, hx L TKA 2022     Manuals    Ankle PROM gil       KL   IASTM plantar fascia gil JV  JV TS  JV  JV  STM KL   gastroc / HS stretching JV JV TS JV JV KL   A/P talar mobs               Neuro Re-Ed               Tandem Balance               SLS Balance 15\"/3 ea  15\"/3 ea 15\"/3 ea  15\"/3 ea 15\"/3  15\"x3   FTEO Foam               FTEC Firm                                               Ther Ex               HR/TR standing 2/10 ea slow ECC 2x10 ea slow ecc 2/10 ea  2/10 ea 2/10 2x10 ea   Bike / Nustep (ROM) 10' nustep strength 10' nustep strength 10' nustep strength 10' nustep strength 10' nustep strength 10' nustep   Towel Plantar Fascia Stretch 30\"x3 B 30\"x3 B 30\"/3 B 30\" x 3 B  30\"/3 B 30\"x3 B   Seated HS Stretch NV DC  DC   30\"/3 B with stool 3x30\" B with stool   Seated HR; slow eccentric DC   DC  20x stand slow ecc  20x stand slow ecc   Towel Scrunches 3\"x20 3\"x20 3\"/20 ea 3\" x 20  3\" 30x 3\" 30x   Ankle TB 4 way L2 2/10 ea L3 2x10 ea  L3 2x10 L3 2/10 ea L2 2/10 ea L2 2x10 ea   Inb/ev towel pron/sup 20x ea 20x ea 20x ea 20 x ea  20x ea 20x ea   Standing 1/ foam calf stretch \"  4x20\" \" ea  \"ea  \" ea  \" ea   Ther " "Activity               Step Up Fwd 8\" 2/10 8\" 2x10 8\" 2/10 6\" 2/10  6\" 2/10 6\" 2x10   Eccentric Step Down                               Gait Training                                               Modalities                                                                   "

## 2023-06-28 ENCOUNTER — OFFICE VISIT (OUTPATIENT)
Dept: PHYSICAL THERAPY | Facility: CLINIC | Age: 73
End: 2023-06-28
Payer: COMMERCIAL

## 2023-06-28 DIAGNOSIS — M76.62 TENDONITIS, ACHILLES, LEFT: Primary | ICD-10-CM

## 2023-06-28 DIAGNOSIS — M72.2 PLANTAR FASCIITIS, BILATERAL: ICD-10-CM

## 2023-06-28 PROCEDURE — 97110 THERAPEUTIC EXERCISES: CPT

## 2023-06-28 PROCEDURE — 97112 NEUROMUSCULAR REEDUCATION: CPT

## 2023-06-28 PROCEDURE — 97140 MANUAL THERAPY 1/> REGIONS: CPT

## 2023-07-13 ENCOUNTER — EVALUATION (OUTPATIENT)
Dept: PHYSICAL THERAPY | Facility: CLINIC | Age: 73
End: 2023-07-13
Payer: COMMERCIAL

## 2023-07-13 ENCOUNTER — APPOINTMENT (OUTPATIENT)
Dept: PHYSICAL THERAPY | Facility: CLINIC | Age: 73
End: 2023-07-13
Payer: COMMERCIAL

## 2023-07-13 DIAGNOSIS — M25.562 LEFT KNEE PAIN, UNSPECIFIED CHRONICITY: Primary | ICD-10-CM

## 2023-07-13 DIAGNOSIS — Z96.652 HISTORY OF TOTAL LEFT KNEE REPLACEMENT: ICD-10-CM

## 2023-07-13 PROCEDURE — 97535 SELF CARE MNGMENT TRAINING: CPT | Performed by: PHYSICAL THERAPIST

## 2023-07-13 PROCEDURE — 97161 PT EVAL LOW COMPLEX 20 MIN: CPT | Performed by: PHYSICAL THERAPIST

## 2023-07-13 PROCEDURE — 97110 THERAPEUTIC EXERCISES: CPT | Performed by: PHYSICAL THERAPIST

## 2023-07-13 PROCEDURE — 97140 MANUAL THERAPY 1/> REGIONS: CPT | Performed by: PHYSICAL THERAPIST

## 2023-07-13 NOTE — PROGRESS NOTES
PT Evaluation     Today's date: 2023  Patient name: Solo Ordonez  : [birth year]3  MRN: [id number]  Referring provider: Fadi Guzmán MD  Dx:   Encounter Diagnosis     ICD-10-CM    1. Left knee pain, unspecified chronicity  M25.562       2. History of total left knee replacement  Z96.652                      Assessment  Understanding of Dx/Px/POC: good   Prognosis: good    Goals  STGs: To be complete within 4 weeks  - Decrease pain to < 2/10 at worst  - Increase AROM to WNL  - Increase strength to > 4+/5  - Improve gait to WNL for distances < 6 blocks    LTGs: To be complete within 6 weeks  - Able to walk for any extended amount of time/distance without pain or limitation for increased safety and functional capacity with ADLs and home-related duty  - Able to repetitively squat without pain or limitation for increased safety and functional capacity with ADLs and home-related duty  - Able to repetitively ascend/descend a full flight of stairs without pain or limitation for increased safety and functional capacity with ADLs and home-related duty  - Able to repetitively complete transfers without pain or limitation for increased safety and functional capacity with ADLs and home-related duty  - Able to keel for any extended amount of time without pain or limitation for increased safety and functional capacity with ADLs and home-related duty    Plan  Planned therapy interventions: manual therapy, neuromuscular re-education, patient education, self care, therapeutic activities, therapeutic exercise and home exercise program  Frequency: 2x week  Duration in weeks: 6       Pt is a 74 y.o. male with L knee pain who presents with functional deficits including decreased capacity with walking, squatting, kneeling, stairs, and transfers.  Upon completion of today's initial evaluation, Jay Jay's sx remain consistent with R Knee pain secondary to increased swelling and muscle weakness due to decreased activity level with HEP since formally discharging from physical therapy. Patient will benefit from skilled physical therapy to address current deficits. Subjective Evaluation    Patient Goals  Patient goals for therapy: increased strength, decreased pain and increased motion    Pain  Current pain rating: 3  At best pain ratin  At worst pain ratin  Location: L Knee         Pt reports he is 7 months s/p L Knee TKA. Reports overall he has been pleased with progress, however recently over the past month has noticed an increased in some discomfort and tightness.         Objective Pain level ranges 1-5/10  AROM: L Knee 0-115 degrees; R Knee 0-125 degrees  Strength: L quad and HS 4/5; R Quad and HS 5/5  Gait: Mild lateral limp and shortened step length  Swelling: Mild (will continue to monitor)  Incision: Clean and healing well  Terra's: (-)  FOTO: 69; GOAL: 72  Unable to walk without pain and limitation  Unable to squat without pain and limitation  Unable complete transfers without pain and limitation  Unable to ascend/descend stairs without pain and limitation  Unable to kneel without pain and limitation             Precautions: L Knee TKA 7 months ago    Daily Treatment Diary    HEP: Handout provided and discussed      Manuals 23            ART x15'            PROM/Stretch             IASTM             STM/Triggerpoint             JM                          Neuro Re-Ed             Standing 1/2 Roll calf stretch  This session           SL Ecc HR             SLB AE  This session                                                                            Ther Ex             HS into QS into SLR 2x10            HR/TR  This session           TB TKE 2x10 L5            TB Heel to Toes  This session           Sit to stand  This session           Bridge with Add squeeze             SL Bridge  This session           Clam shells             SL Sit to Stand             BOSU SLB             Upside down BOSU SL squat holds             TB Lat Walks             Step ups/downs  This session                                     Ther Activity             TM             Bike  This session           NuStep             Forward/backward heel to toe walk                          Gait Training                                                                 Modalities             MHP             CP x10'            US/Stim

## 2023-07-13 NOTE — LETTER
2023    Tracy Bryson MD  [address] [place] [address]    Patient: Leanna Maldonado   YOB: 1950   Date of Visit: 2023     Encounter Diagnosis     ICD-10-CM    1. Left knee pain, unspecified chronicity  M25.562       2. History of total left knee replacement  [id number]           Dear Dr. Braxton Amend: Thank you for your recent referral of Leanna Maldonado. Please review the attached evaluation summary from Jay Jay's recent visit. Please verify that you agree with the plan of care by signing the attached order. If you have any questions or concerns, please do not hesitate to call. I sincerely appreciate the opportunity to share in the care of one of your patients and hope to have another opportunity to work with you in the near future. Sincerely,    Papo Ramirez, PT, DPT, ATC, ART      Referring Provider:      I certify that I have read the below Plan of Care and certify the need for these services furnished under this plan of treatment while under my care. Tracy Bryson MD  [address] [place] [address] Dr  Via Fax: [phone]          PT Evaluation     Today's date: 2023  Patient name: Leanna Maldonado  : [birth year]  MRN: [id number]  Referring provider: Tracy Bryson MD  Dx:   Encounter Diagnosis     ICD-10-CM    1. Left knee pain, unspecified chronicity  M25.562       2.  History of total left knee replacement  Z96.652                      Assessment  Understanding of Dx/Px/POC: good   Prognosis: good    Goals  STGs: To be complete within 4 weeks  - Decrease pain to < 2/10 at worst  - Increase AROM to WNL  - Increase strength to > 4+/5  - Improve gait to WNL for distances < 6 blocks    LTGs: To be complete within 6 weeks  - Able to walk for any extended amount of time/distance without pain or limitation for increased safety and functional capacity with ADLs and home-related duty  - Able to repetitively squat without pain or limitation for increased safety and functional capacity with ADLs and home-related duty  - Able to repetitively ascend/descend a full flight of stairs without pain or limitation for increased safety and functional capacity with ADLs and home-related duty  - Able to repetitively complete transfers without pain or limitation for increased safety and functional capacity with ADLs and home-related duty  - Able to keel for any extended amount of time without pain or limitation for increased safety and functional capacity with ADLs and home-related duty    Plan  Planned therapy interventions: manual therapy, neuromuscular re-education, patient education, self care, therapeutic activities, therapeutic exercise and home exercise program  Frequency: 2x week  Duration in weeks: 6       Pt is a 74 y.o. male with L knee pain who presents with functional deficits including decreased capacity with walking, squatting, kneeling, stairs, and transfers. Upon completion of today's initial evaluation, Jay Jay's sx remain consistent with R Knee pain secondary to increased swelling and muscle weakness due to decreased activity level with HEP since formally discharging from physical therapy. Patient will benefit from skilled physical therapy to address current deficits. Subjective Evaluation    Patient Goals  Patient goals for therapy: increased strength, decreased pain and increased motion    Pain  Current pain rating: 3  At best pain ratin  At worst pain ratin  Location: L Knee         Pt reports he is 7 months s/p L Knee TKA. Reports overall he has been pleased with progress, however recently over the past month has noticed an increased in some discomfort and tightness.         Objective Pain level ranges 1-5/10  AROM: L Knee 0-115 degrees; R Knee 0-125 degrees  Strength: L quad and HS 4/5; R Quad and HS 5/5  Gait: Mild lateral limp and shortened step length  Swelling: Mild (will continue to monitor)  Incision: Clean and healing well  Terra's: (-)  FOTO: 69; GOAL: 72  Unable to walk without pain and limitation  Unable to squat without pain and limitation  Unable complete transfers without pain and limitation  Unable to ascend/descend stairs without pain and limitation  Unable to kneel without pain and limitation            Precautions: L Knee TKA 7 months ago    Daily Treatment Diary    HEP: Handout provided and discussed      Manuals 7/13/23            ART x15'            PROM/Stretch             IASTM             STM/Triggerpoint             JM                          Neuro Re-Ed             Standing 1/2 Roll calf stretch  This session           SL Ecc HR             SLB AE  This session                                                                            Ther Ex             HS into QS into SLR 2x10            HR/TR  This session           TB TKE 2x10 L5            TB Heel to Toes  This session           Sit to stand  This session           Bridge with Add squeeze             SL Bridge  This session           Clam shells             SL Sit to Stand             BOSU SLB             Upside down BOSU SL squat holds             TB Lat Walks             Step ups/downs  This session                                     Ther Activity             TM             Bike  This session           NuStep             Forward/backward heel to toe walk                          Gait Training                                                                 Modalities             MHP             CP x10'            US/Stim

## 2023-07-17 ENCOUNTER — OFFICE VISIT (OUTPATIENT)
Dept: PHYSICAL THERAPY | Facility: CLINIC | Age: 73
End: 2023-07-17
Payer: COMMERCIAL

## 2023-07-17 DIAGNOSIS — Z96.652 HISTORY OF TOTAL LEFT KNEE REPLACEMENT: ICD-10-CM

## 2023-07-17 DIAGNOSIS — M25.562 LEFT KNEE PAIN, UNSPECIFIED CHRONICITY: Primary | ICD-10-CM

## 2023-07-17 PROCEDURE — 97140 MANUAL THERAPY 1/> REGIONS: CPT

## 2023-07-17 PROCEDURE — 97110 THERAPEUTIC EXERCISES: CPT

## 2023-07-17 PROCEDURE — 97112 NEUROMUSCULAR REEDUCATION: CPT

## 2023-07-17 NOTE — PROGRESS NOTES
Daily Note     Today's date: 2023  Patient name: Kale Strickland  :   MRN: [id number]  Referring provider: Payton Allen MD  Dx:   Encounter Diagnosis     ICD-10-CM    1. Left knee pain, unspecified chronicity  M25.562       2. History of total left knee replacement  Z96.652                      Subjective: Reports L knee is doing well. However states having tightness in knee and feeling of swelling. Objective: See treatment diary below      Assessment: Tolerated treatment well. Patient exhibited good technique with therapeutic exercises. Pt with mild restriction with L knee flx ROM. Pt cont to jalyn strength program demonstrating good overall strength and function. Plan: Continue per plan of care.       Precautions: L Knee TKA 7 months ago    Daily Treatment Diary    HEP: Handout provided and discussed      Manuals 23           ART x15'            PROM/Stretch  10' JV           IASTM             STM/Triggerpoint             JM                          Neuro Re-Ed             Standing 1/2 Roll calf stretch  4/20"           SL Ecc HR             SLB AE  next session                                                                            Ther Ex             HS into QS into SLR 2x10 2/10           HR/TR  3/10           TB TKE 2x10 L5 3/10 L5           TB Heel to Toes  next session           Sit to stand  3/10           Bridge with Add squeeze  2/10           SL Bridge             Clam shells             SL Sit to Stand             BOSU SLB             Upside down BOSU SL squat holds             TB Lat Walks             Step ups/downs  6" 2/10                                     Ther Activity             TM             Bike  10' L4 ROM           NuStep             Forward/backward heel to toe walk                          Gait Training                                                                 Modalities             MHP             CP x10' 10'           US/Stim

## 2023-07-24 ENCOUNTER — OFFICE VISIT (OUTPATIENT)
Dept: PHYSICAL THERAPY | Facility: CLINIC | Age: 73
End: 2023-07-24
Payer: COMMERCIAL

## 2023-07-24 DIAGNOSIS — M25.562 LEFT KNEE PAIN, UNSPECIFIED CHRONICITY: Primary | ICD-10-CM

## 2023-07-24 DIAGNOSIS — Z96.652 HISTORY OF TOTAL LEFT KNEE REPLACEMENT: ICD-10-CM

## 2023-07-24 PROCEDURE — 97112 NEUROMUSCULAR REEDUCATION: CPT | Performed by: PHYSICAL THERAPIST

## 2023-07-24 PROCEDURE — 97530 THERAPEUTIC ACTIVITIES: CPT | Performed by: PHYSICAL THERAPIST

## 2023-07-24 PROCEDURE — 97110 THERAPEUTIC EXERCISES: CPT | Performed by: PHYSICAL THERAPIST

## 2023-07-24 PROCEDURE — 97140 MANUAL THERAPY 1/> REGIONS: CPT | Performed by: PHYSICAL THERAPIST

## 2023-07-24 NOTE — PROGRESS NOTES
Daily Note     Today's date: 2023  Patient name: Kale Strickland  :   MRN: [id number]  Referring provider: Payton Allen MD  Dx:   Encounter Diagnosis     ICD-10-CM    1. Left knee pain, unspecified chronicity  M25.562       2. History of total left knee replacement  Z96.652                      Subjective: Reports L knee continued progress, but slower than desirable. However, states having tightness in knee and feeling of swelling. Objective: See treatment diary below      Assessment: Tolerated treatment well. Patient demonstrated fatigue post treatment, exhibited good technique with therapeutic exercises and would benefit from continued PT. Pt with mild restriction with L knee flx ROM. Pt cont to jalyn strength program demonstrating good overall strength and function. Plan: Continue per plan of care. Progress treatment as tolerated.          Precautions: L Knee TKA 7 months ago    Daily Treatment Diary    HEP: Handout provided and discussed      Manuals 23     ART x15'  x15'     PROM/Stretch  10' JV      IASTM        STM/Triggerpoint        JM                Neuro Re-Ed        Standing 1/ Roll calf stretch  " 4x20''     SL Ecc HR        SLB AE    This session                                            Ther Ex        HS into QS into SLR 2x10 2/10 3x10     HR/TR  3/10 3x10     TB TKE 2x10 L5 3/10 L5 3x10 L5     TB Heel to Toes   3x10 L4     Sit to stand  3/10 3x10     Bridge with Add squeeze  2/10 3x10             Clam shells        SL Sit to Stand        BOSU SLB        Upside down BOSU SL squat holds        TB Lat Walks        Step ups/downs  6" 2/10 8'' 3x10     TRX Squats   2x10             Ther Activity        TM        Bike  10' L4 ROM x10'     NuStep        Forward/backward heel to toe walk                Gait Training                                        Modalities        MHP        CP x10' 10' x10'     US/Stim

## 2023-07-26 ENCOUNTER — OFFICE VISIT (OUTPATIENT)
Dept: PHYSICAL THERAPY | Facility: CLINIC | Age: 73
End: 2023-07-26
Payer: COMMERCIAL

## 2023-07-26 DIAGNOSIS — M72.2 PLANTAR FASCIITIS, BILATERAL: ICD-10-CM

## 2023-07-26 DIAGNOSIS — M76.62 TENDONITIS, ACHILLES, LEFT: Primary | ICD-10-CM

## 2023-07-26 PROCEDURE — 97110 THERAPEUTIC EXERCISES: CPT

## 2023-07-26 PROCEDURE — 97112 NEUROMUSCULAR REEDUCATION: CPT

## 2023-07-26 PROCEDURE — 97140 MANUAL THERAPY 1/> REGIONS: CPT

## 2023-07-26 NOTE — PROGRESS NOTES
Daily Note     Today's date: 2023  Patient name: Elvis Kwon  : [birth year]  MRN: [id number]  Referring provider: Celio Marcos, *  Dx:   Encounter Diagnosis     ICD-10-CM    1. Tendonitis, Achilles, left  M76.62                      Subjective: Pt reports being at beach made B feet worse. Reports some improved sx however cont to have pain along metatarsals radiating into B plantar fascia. Objective: See treatment diary below      Assessment: Tolerated treatment well. Patient exhibited good technique with therapeutic exercises. Pt with cont pain in B plantar fascia today. Cont to c/o pain with increased walking. Plan: Continue per plan of care.       Precautions: L Knee TKA 7 months ago    Daily Treatment Diary    HEP: Handout provided and discussed    Manuals    Ankle PROM gil              IASTM plantar fascia gil JV  JV TS  JV  JV  STM JV   gastroc / HS stretching JV JV TS JV JV    A/P talar mobs               Neuro Re-Ed               Tandem Balance               SLS Balance 15"/3 ea  15"/3 ea 15"/3 ea  15"/3 ea 15"/3  15"x3   FTEO Foam               FTEC Firm                                               Ther Ex               HR/TR standing 2/10 ea slow ECC 2x10 ea slow ecc 2/10 ea  2/10 ea 2/10 2x10 ea   Bike / Nustep (ROM) 10' nustep strength 10' nustep strength 10' nustep strength 10' nustep strength 10' nustep strength 10' nustep   Towel Plantar Fascia Stretch 30"x3 B 30"x3 B 30"/3 B 30" x 3 B  30"/3 B 30"x3 B   Seated HS Stretch NV DC  DC         Seated HR; slow eccentric DC   DC     20x stand slow ecc   Towel Scrunches 3"x20 3"x20 3"/20 ea 3" x 20  3" 30x 3" 30x   Ankle TB 4 way L2 2/10 ea L3 2x10 ea  L3 2x10 L3 2/10 ea L3 2/10 ea L3 2x10 ea   Inb/ev towel pron/sup 20x ea 20x ea 20x ea 20 x ea  20x ea 20x ea   Standing 1/2 foam calf stretch 4/20"  4x20" 4/20" ea  4/20"ea  4/20" ea  " ea   Ther Activity               Step Up Fwd 8" 2/10 8" 2x10 8" 2/10 6" 2/10  6" 2/10 6" 2x10   Eccentric Step Down                               Gait Training                                               Modalities

## 2023-07-31 ENCOUNTER — OFFICE VISIT (OUTPATIENT)
Dept: PHYSICAL THERAPY | Facility: CLINIC | Age: 73
End: 2023-07-31
Payer: COMMERCIAL

## 2023-07-31 DIAGNOSIS — M25.562 LEFT KNEE PAIN, UNSPECIFIED CHRONICITY: Primary | ICD-10-CM

## 2023-07-31 PROCEDURE — 97110 THERAPEUTIC EXERCISES: CPT

## 2023-07-31 PROCEDURE — 97112 NEUROMUSCULAR REEDUCATION: CPT

## 2023-07-31 NOTE — PROGRESS NOTES
Daily Note     Today's date: 2023  Patient name: Kale Strickland  :   MRN: [id number]  Referring provider: Payton Allen MD  Dx:   Encounter Diagnosis     ICD-10-CM    1. Left knee pain, unspecified chronicity  M25.562                      Subjective: Pt reports L knee has a heavy feeling to it but doing well. Objective: See treatment diary below      Assessment: Tolerated treatment well. Patient exhibited good technique with therapeutic exercises. Pt jalyn program without c/o pain. Making cont gains with improved strength and stability. Demonstrates improved PROM with knee flx. Plan: Continue per plan of care.       Precautions: L Knee TKA 7 months ago    Daily Treatment Diary    HEP: Handout provided and discussed    Manuals 23     ART x15'   x15'       PROM/Stretch   10' JV    10' JV     IASTM             STM/Triggerpoint             JM                           Neuro Re-Ed             Standing 1/2 Roll calf stretch   4/20" 4x20''  4/20"      SL Ecc HR             SLB AE       NV                                                                           Ther Ex             HS into QS into SLR 2x10 2/10 3x10  3/10     HR/TR   3/10 3x10  3/10     TB TKE 2x10 L5 3/10 L5 3x10 L5  3/10 L5     TB Heel to Toes     3x10 L4  3/10 L4     Sit to stand   3/10 3x10  3/10     Bridge with Add squeeze   2/10 3x10  3/10                   Clam shells             SL Sit to Stand             BOSU SLB             Upside down BOSU SL squat holds             TB Lat Walks             Step ups/downs   6" 2/10 8'' 3x10  8" 3/10     TRX Squats     2x10  2/10                   Ther Activity             TM             Bike   10' L4 ROM x10'  10' ROM     NuStep             Forward/backward heel to toe walk                           Gait Training                                                                     Modalities             MHP             CP x10' 10' x10'  10'     US/Stim

## 2023-08-02 ENCOUNTER — OFFICE VISIT (OUTPATIENT)
Dept: PHYSICAL THERAPY | Facility: CLINIC | Age: 73
End: 2023-08-02
Payer: COMMERCIAL

## 2023-08-02 DIAGNOSIS — M72.2 PLANTAR FASCIITIS, BILATERAL: ICD-10-CM

## 2023-08-02 DIAGNOSIS — M76.62 TENDONITIS, ACHILLES, LEFT: Primary | ICD-10-CM

## 2023-08-02 PROCEDURE — 97112 NEUROMUSCULAR REEDUCATION: CPT

## 2023-08-02 PROCEDURE — 97140 MANUAL THERAPY 1/> REGIONS: CPT

## 2023-08-02 PROCEDURE — 97110 THERAPEUTIC EXERCISES: CPT

## 2023-08-02 NOTE — PROGRESS NOTES
Daily Note     Today's date: 2023  Patient name: Paulo Johnson  :   MRN: [id number]  Referring provider: Yennifer Jamison, *  Dx:   Encounter Diagnosis     ICD-10-CM    1. Tendonitis, Achilles, left  M76.62       2. Plantar fasciitis, bilateral  M72.2                      Subjective: Pt reports he is cont to have pain in B feet. Will see MD in 2 weeks. Objective: See treatment diary below      Assessment: Tolerated treatment well. Patient exhibited good technique with therapeutic exercises. Pt with cont point tenderness along B plantar fascia. Reports cont pain with increased activity. Plan: Continue per plan of care.       Precautions: L Knee TKA 7 months ago    Daily Treatment Diary    HEP: Handout provided and discussed    Manuals    Ankle PROM gil               IASTM plantar fascia gil JV  JV TS  JV  JV  STM JV   gastroc / HS stretching JV JV TS JV JV     A/P talar mobs               Neuro Re-Ed               Tandem Balance               SLS Balance 15"/3 ea  15"/3 ea 15"/3 ea  15"/3 ea 15"/3  15"x3   FTEO Foam               FTEC Firm                                               Ther Ex               HR/TR standing 2/10 ea slow ECC 2x10 ea slow ecc 2/10 ea  2/10 ea 2/10 2x10 ea   Bike / Nustep (ROM) 10' nustep strength 10' nustep strength 10' nustep strength 10' nustep strength 10' nustep strength 10' nustep   Towel Plantar Fascia Stretch 30"x3 B 30"x3 B 30"/3 B 30" x 3 B  30"/3 B 30"x3 B   Seated HS Stretch NV DC  DC          Seated HR; slow eccentric DC   DC     20x stand slow ecc   Towel Scrunches 3"x20 3"x20 3"/20 ea 3" x 20  3" 30x 3" 30x   Ankle TB 4 way L2 2/10 ea L3 2x10 ea  L3 2x10 L3 2/10 ea L3 2/10 ea L3 2x10 ea   Inb/ev towel pron/sup 20x ea 20x ea 20x ea 20 x ea  20x ea 20x ea   Standing 1/2 foam calf stretch 4/20"  4x20" 4/20" ea  4/20"ea  4/20" ea  4/20" ea   Ther Activity               Step Up Fwd 8" 2/10 8" 2x10 8" 2/10 6" 2/10  6" 2/10 6" 2x10   Eccentric Step Down                               Gait Training                                               Modalities

## 2023-08-07 ENCOUNTER — APPOINTMENT (OUTPATIENT)
Dept: PHYSICAL THERAPY | Facility: CLINIC | Age: 73
End: 2023-08-07
Payer: COMMERCIAL

## 2023-08-09 ENCOUNTER — APPOINTMENT (OUTPATIENT)
Dept: PHYSICAL THERAPY | Facility: CLINIC | Age: 73
End: 2023-08-09
Payer: COMMERCIAL

## 2023-08-14 ENCOUNTER — OFFICE VISIT (OUTPATIENT)
Dept: PHYSICAL THERAPY | Facility: CLINIC | Age: 73
End: 2023-08-14
Payer: COMMERCIAL

## 2023-08-14 DIAGNOSIS — M25.562 LEFT KNEE PAIN, UNSPECIFIED CHRONICITY: Primary | ICD-10-CM

## 2023-08-14 DIAGNOSIS — Z96.652 HISTORY OF TOTAL LEFT KNEE REPLACEMENT: ICD-10-CM

## 2023-08-14 PROCEDURE — 97140 MANUAL THERAPY 1/> REGIONS: CPT

## 2023-08-14 PROCEDURE — 97112 NEUROMUSCULAR REEDUCATION: CPT

## 2023-08-14 PROCEDURE — 97110 THERAPEUTIC EXERCISES: CPT

## 2023-08-14 NOTE — PROGRESS NOTES
Daily Note     Today's date: 2023  Patient name: Cesar Lee  :   MRN: [id number]  Referring provider: Osmar Duong MD  Dx:   Encounter Diagnosis     ICD-10-CM    1. Left knee pain, unspecified chronicity  M25.562       2. History of total left knee replacement  Z96.652                      Subjective: Pt reports being on vacation at beach and having increased pain in B knees and feet with walking on sand. Objective: See treatment diary below      Assessment: Tolerated treatment well. Patient demonstrated fatigue post treatment and exhibited good technique with therapeutic exercises. Pt cont to demonstrate improved ROM and function in L knee. Cont to c/o B foot and knee pain at times. Plan: Continue per plan of care.       Precautions: L Knee TKA 7 months ago    Daily Treatment Diary    HEP: Handout provided and discussed    Manuals 23   ART x15'   x15'       PROM/Stretch   10' JV    10' JV  10' JV   IASTM             STM/Triggerpoint             JM                           Neuro Re-Ed             Standing / Roll calf stretch   4/20" 4x20''  4/20"   4/20"    SL Ecc HR             SLB AE       NV                                                                           Ther Ex             HS into QS into SLR 2x10 2/10 3x10  3/10  3/10   HR/TR   3/10 3x10  3/10  3/10   TB TKE 2x10 L5 3/10 L5 3x10 L5  3/10 L5  3/10 L5   TB Heel to Toes     3x10 L4  3/10 L4  3/10 L4   Sit to stand   3/10 3x10  3/10  NV   Bridge with Add squeeze   2/10 3x10  3/10  3/10                 Clam shells             SL Sit to Stand             BOSU SLB             Upside down BOSU SL squat holds             TB Lat Walks             Step ups/downs   6" 2/10 8'' 3x10  8" 3/10  8" 3/10   TRX Squats     2x10  2/10  2/10                 Ther Activity             TM             Bike   10' L4 ROM x10'  10' ROM  10' ROM   NuStep             Forward/backward heel to toe walk                           Gait Training                                                                     Modalities             P             CP x10' 10' x10'  10'  10'   US/Stim

## 2023-08-16 ENCOUNTER — APPOINTMENT (OUTPATIENT)
Dept: PHYSICAL THERAPY | Facility: CLINIC | Age: 73
End: 2023-08-16
Payer: COMMERCIAL

## 2023-08-21 ENCOUNTER — OFFICE VISIT (OUTPATIENT)
Dept: PHYSICAL THERAPY | Facility: CLINIC | Age: 73
End: 2023-08-21
Payer: COMMERCIAL

## 2023-08-21 DIAGNOSIS — Z96.652 HISTORY OF TOTAL LEFT KNEE REPLACEMENT: ICD-10-CM

## 2023-08-21 DIAGNOSIS — M25.562 LEFT KNEE PAIN, UNSPECIFIED CHRONICITY: Primary | ICD-10-CM

## 2023-08-21 PROCEDURE — 97140 MANUAL THERAPY 1/> REGIONS: CPT

## 2023-08-21 PROCEDURE — 97110 THERAPEUTIC EXERCISES: CPT

## 2023-08-21 PROCEDURE — 97112 NEUROMUSCULAR REEDUCATION: CPT

## 2023-08-21 NOTE — PROGRESS NOTES
Daily Note     Today's date: 2023  Patient name: Malaika Dow  : [id number]  MRN: [id number]  Referring provider: Panchito Barron MD  Dx:   Encounter Diagnosis     ICD-10-CM    1. Left knee pain, unspecified chronicity  M25.562       2. History of total left knee replacement  Z96.652                      Subjective: Pt reports he cont to do well and knee cont to improve with motion. Objective: See treatment diary below      Assessment: Tolerated treatment well. Patient exhibited good technique with therapeutic exercises. Pt making cont gains with overall strength and ROM. Pt cont to demonstrate improved jalyn to program.       Plan: Continue per plan of care.       Precautions: L Knee TKA 7 months ago    Daily Treatment Diary    HEP: Handout provided and discussed    Manuals 23   ART    x15'       PROM/Stretch  10' JV 10' JV    10' JV  10' JV   IASTM             STM/Triggerpoint             JM                           Neuro Re-Ed             Standing 1/ Roll calf stretch  "  20" 4x20''  "   20"    SL Ecc HR             SLB AE  3/20"      NV                                                                           Ther Ex             HS into QS into SLR 3x10 2/10 3x10  3/10  3/10   HR/TR   3/10 3x10  3/10  3/10   TB TKE 3x10 L5 3/10 L5 3x10 L5  3/10 L5  3/10 L5   TB Heel to Toes  3/10 L4   3x10 L4  3/10 L4  3/10 L4   Sit to stand  3/10 3/10 3x10  3/10  NV   Bridge with Add squeeze  3/10 2/10 3x10  3/10  3/10                 Clam shells             SL Sit to Stand             BOSU SLB             Upside down BOSU SL squat holds             TB Lat Walks             Step ups/downs  8" 3/10 6" 2/10 8'' 3x10  8" 3/10  8" 3/10   TRX Squats  2/10   2x10  2/10  2/10                 Ther Activity             TM             Bike  10' ROM 10' L4 ROM x10'  10' ROM  10' ROM   NuStep             Forward/backward heel to toe walk                           Gait Training                                                                     Modalities             MHP             CP  10' x10'  10'  10'   US/Stim

## 2023-08-23 ENCOUNTER — APPOINTMENT (OUTPATIENT)
Dept: PHYSICAL THERAPY | Facility: CLINIC | Age: 73
End: 2023-08-23
Payer: COMMERCIAL

## 2023-08-28 ENCOUNTER — OFFICE VISIT (OUTPATIENT)
Dept: PHYSICAL THERAPY | Facility: CLINIC | Age: 73
End: 2023-08-28
Payer: COMMERCIAL

## 2023-08-28 DIAGNOSIS — Z96.652 HISTORY OF TOTAL LEFT KNEE REPLACEMENT: ICD-10-CM

## 2023-08-28 DIAGNOSIS — M25.562 LEFT KNEE PAIN, UNSPECIFIED CHRONICITY: Primary | ICD-10-CM

## 2023-08-28 PROCEDURE — 97110 THERAPEUTIC EXERCISES: CPT

## 2023-08-28 PROCEDURE — 97112 NEUROMUSCULAR REEDUCATION: CPT

## 2023-08-28 PROCEDURE — 97140 MANUAL THERAPY 1/> REGIONS: CPT

## 2023-08-28 NOTE — PROGRESS NOTES
Daily Note     Today's date: 2023  Patient name: Dagmar Monday  : [birth year]  MRN: [id number]  Referring provider: Hedwig Schirmer, MD  Dx:   Encounter Diagnosis     ICD-10-CM    1. Left knee pain, unspecified chronicity  M25.562       2. History of total left knee replacement  Z96.652                      Subjective: Pt reports he is doing well. States seeing MD and had knee drained. States they are testing and took blood work for possible gout. Pt will DC today to [facility]P. Objective: See treatment diary below      Assessment: Tolerated treatment well. Patient demonstrated fatigue post treatment and exhibited good technique with therapeutic exercises. Pt with improved jalyn to program. Will benefit from cont HEP. Pt instructed on HEP and given pictures. Plan: Continue per plan of care.       Precautions: L Knee TKA 7 months ago    Daily Treatment Diary    HEP: Handout provided and discussed    Manuals 23   ART    x15'       PROM/Stretch  10' JV 10' JV    10' JV  10' JV   IASTM             STM/Triggerpoint             JM                           Neuro Re-Ed             Standing 1/ Roll calf stretch  4/20"  4/20" 4x20''  4/20"   4/20"    SL Ecc HR             SLB AE  3/20"      NV                                                                           Ther Ex             HS into QS into SLR 3x10 3/10 3x10  3/10  3/10   HR/TR   3/10 3x10  3/10  3/10   TB TKE 3x10 L5 3/10 L5 3x10 L5  3/10 L5  3/10 L5   TB Heel to Toes  3/10 L4  3/10 L4 3x10 L4  3/10 L4  3/10 L4   Sit to stand  3/10 3/10 3x10  3/10  NV   Bridge with Add squeeze  3/10 3/10 3x10  3/10  3/10                 Clam shells             SL Sit to Stand             BOSU SLB             Upside down BOSU SL squat holds             TB Lat Walks             Step ups/downs  8" 3/10 6" 2/10 8'' 3x10  8" 3/10  8" 3/10   TRX Squats  2/10  2/10 2x10  2/10  2/10                 Ther Activity             TM             Bike  10' ROM 10' L4 ROM x10'  10' ROM  10' ROM   NuStep             Forward/backward heel to toe walk                           Gait Training                                                                     Modalities             MHP             CP   x10'  10'  10'   US/Stim

## 2023-08-28 NOTE — PROGRESS NOTES
PT Discharge     Today's date: 2023  Patient name: Micky Aguirre  : [birth year]  MRN: [id number]  Referring provider: Trinity Garcia MD  Dx:   Encounter Diagnosis     ICD-10-CM    1. Left knee pain, unspecified chronicity  M25.562       2. History of total left knee replacement  Z96.652           Start Time: 1400  Stop Time: 1500  Total time in clinic (min): 60 minutes    Subjective: Pt reports he will D/C to [facility] after today's session, as he has achieved all personal and functional goals. Objective: See treatment diary below. Goals met. Assessment: Pt will be D/C to [facility]P, as he has achieved all personal and functional goals. Pt instructed tor each out with any questions/concerns/setbacks. Plan: Pt will be D/C to [facility]P.

## 2023-08-30 ENCOUNTER — APPOINTMENT (OUTPATIENT)
Dept: PHYSICAL THERAPY | Facility: CLINIC | Age: 73
End: 2023-08-30
Payer: COMMERCIAL